# Patient Record
Sex: FEMALE | Race: WHITE | Employment: PART TIME | URBAN - METROPOLITAN AREA
[De-identification: names, ages, dates, MRNs, and addresses within clinical notes are randomized per-mention and may not be internally consistent; named-entity substitution may affect disease eponyms.]

---

## 2018-07-30 ENCOUNTER — OFFICE VISIT (OUTPATIENT)
Dept: OBGYN CLINIC | Age: 23
End: 2018-07-30
Payer: MEDICAID

## 2018-07-30 VITALS
WEIGHT: 227 LBS | SYSTOLIC BLOOD PRESSURE: 110 MMHG | BODY MASS INDEX: 33.62 KG/M2 | HEIGHT: 69 IN | DIASTOLIC BLOOD PRESSURE: 64 MMHG | HEART RATE: 84 BPM

## 2018-07-30 DIAGNOSIS — N91.2 AMENORRHEA: Primary | ICD-10-CM

## 2018-07-30 PROCEDURE — 1036F TOBACCO NON-USER: CPT | Performed by: OBSTETRICS & GYNECOLOGY

## 2018-07-30 PROCEDURE — G8417 CALC BMI ABV UP PARAM F/U: HCPCS | Performed by: OBSTETRICS & GYNECOLOGY

## 2018-07-30 PROCEDURE — 99213 OFFICE O/P EST LOW 20 MIN: CPT | Performed by: OBSTETRICS & GYNECOLOGY

## 2018-07-30 PROCEDURE — G8427 DOCREV CUR MEDS BY ELIG CLIN: HCPCS | Performed by: OBSTETRICS & GYNECOLOGY

## 2018-07-30 NOTE — PROGRESS NOTES
Expiration Date:   2019    Varicella Zoster Antibody, IgG     Standing Status:   Future     Number of Occurrences:   1     Standing Expiration Date:   2019    Prenatal Testing for Fetal Aneuploidy     PANORAMA TEST     Standing Status:   Future     Number of Occurrences:   1     Standing Expiration Date:   2019    Miscellaneous Lab Test #1     ARUN HORIZON     Standing Status:   Future     Number of Occurrences:   1     Standing Expiration Date:   2019     Order Specific Question:   Specify Req. Test (1 Test/Order)     Answer:   cystic fibrosis    Type and screen     Standing Status:   Future     Number of Occurrences:   1     Standing Expiration Date:   2019      No orders of the defined types were placed in this encounter. Plan:   MD Dalia Pierre  2018  Patient's last menstrual period was 2018. INITIAL OBSTETRICAL VISIT EVALUATION:  The patient was seen full history and physical was completed/reviewed. Cytology was collected for patients over 24years of age. Cultures were collected. The patient was counseled on office policies and she was counseled on termination of pregnancy in the state of [place]. The patient was counseled on Toxoplasmosis, HIV, Tobacco Abuse, Group Beta Strep Infections, Cystic Fibrosis,  Labor precautions and Sickle Cell disease. The patient was counseled on the risks of tobacco abuse. Both maternal and fetal. She was instructed to stop smoking if currently using tobacco. Morbidity, mortality, and cessation programs were reviewed. The risks include but are not limited to increased risks of  labor,  delivery, premature rupture of membranes, intrauterine growth restriction, intrauterine fetal demise and abruptio placenta. Secondary smoke risks were also reviewed. Increases in cancer, respiratory problems, and sudden infant death syndrome were reviewed as well.     The patient was informed of a 2-4% risk of congenital anomalies in the general population. She was also informed that karyotyping is the only way to evaluate the fetus for genetic problems and genetic lethal anomalies. Chorionic villous sampling, amniocentesis and Maternal Genetic Blood Sampling-(NIPT Testing) were also discussed with morbidity rates in detail. She requested any of the options. Route of delivery and counseling on vaginal, operative vaginal, and  sections were completed with the risks of each to both the patient as well as her baby. The possibility of a blood transfusion was discussed as well. The patient was not opposed to receiving a transfusion if needed. Nuchal translucency and MSAFP single marker testing was reviewed in detail with attention to timing of testing and their windows. For patients beyond the gestational age for Nuchal translucency evaluation Quad testing was recommended. Timing for the Quad test was reviewed. Benefits of the above testing was reviewed. A second trimester amniocentesis was also made available to the patient. Risks, Benefits and non-invasive alternative testing was reviewed. The literature regarding a questionable link to pitocin augmentation and induction of labor, the assistance of labor contractions and the initiation of contractions to help delivery, have been reviewed with the patient regarding the increased potential of having a  with Attention Deficit Hyperactivity Disorder and or Autism. These two disorders and the ramifications of their impact on a child and the family caring for that child has been reviewed with the patient in detail. She was given the risks, benefits and alternatives of the use of this medication. She has agreed to its use in the delivery of her unborn child if needed at the time of delivery, Yes.     The patient was questioned in detail regarding any genetic misnomer history, chromosomal abnormalities, or learning disabilities in  herself, the father of the

## 2018-08-01 DIAGNOSIS — N91.2 AMENORRHEA: ICD-10-CM

## 2018-08-01 LAB
ABO/RH: NORMAL
ANTIBODY SCREEN: NORMAL
BASOPHILS ABSOLUTE: 0 K/UL (ref 0–0.2)
BASOPHILS RELATIVE PERCENT: 0.3 %
EOSINOPHILS ABSOLUTE: 0.1 K/UL (ref 0–0.7)
EOSINOPHILS RELATIVE PERCENT: 0.9 %
GONADOTROPIN, CHORIONIC (HCG) QUANT: NORMAL MIU/ML
HCT VFR BLD CALC: 36.1 % (ref 37–47)
HEMOGLOBIN: 12.2 G/DL (ref 12–16)
LYMPHOCYTES ABSOLUTE: 1.4 K/UL (ref 1–4.8)
LYMPHOCYTES RELATIVE PERCENT: 14.2 %
MCH RBC QN AUTO: 30.2 PG (ref 27–31.3)
MCHC RBC AUTO-ENTMCNC: 33.7 % (ref 33–37)
MCV RBC AUTO: 89.7 FL (ref 82–100)
MONOCYTES ABSOLUTE: 0.5 K/UL (ref 0.2–0.8)
MONOCYTES RELATIVE PERCENT: 5.4 %
NEUTROPHILS ABSOLUTE: 7.7 K/UL (ref 1.4–6.5)
NEUTROPHILS RELATIVE PERCENT: 79.2 %
PDW BLD-RTO: 14.4 % (ref 11.5–14.5)
PLATELET # BLD: 271 K/UL (ref 130–400)
RBC # BLD: 4.02 M/UL (ref 4.2–5.4)
WBC # BLD: 9.8 K/UL (ref 4.8–10.8)

## 2018-08-02 ENCOUNTER — TELEPHONE (OUTPATIENT)
Dept: OBGYN CLINIC | Age: 23
End: 2018-08-02

## 2018-08-02 LAB
HEPATITIS B SURFACE ANTIGEN INTERPRETATION: NORMAL
RUBELLA ANTIBODY IGG: 124.3 IU/ML

## 2018-08-03 LAB
HEMOGLOBIN A-1 QUANTITATION: 96.9 % (ref 95–97.9)
HEMOGLOBIN A2 QUANTITATION: 2.9 % (ref 2–3.5)
HEMOGLOBIN C QUANTITATION: 0 % (ref 0–0)
HEMOGLOBIN E QUANTITATION: 0 % (ref 0–0)
HEMOGLOBIN ELECTROPHORESIS: NORMAL
HEMOGLOBIN EVALUATION: NORMAL
HEMOGLOBIN F QUANTITATION: 0.2 % (ref 0–2.1)
HEMOGLOBIN OTHER: 0 % (ref 0–0)
HEMOGLOBIN S QUANTITATION: 0 % (ref 0–0)
HIV-1 AND HIV-2 ANTIBODIES: NEGATIVE
PAP SMEAR: NORMAL
RPR: NORMAL
SICKLE CELL: NORMAL

## 2018-08-03 RX ORDER — AZITHROMYCIN 500 MG/1
1000 TABLET, FILM COATED ORAL ONCE
Qty: 2 TABLET | Refills: 0 | Status: SHIPPED | OUTPATIENT
Start: 2018-08-03 | End: 2018-08-03

## 2018-08-03 RX ORDER — METRONIDAZOLE 500 MG/1
500 TABLET ORAL 2 TIMES DAILY
Qty: 14 TABLET | Refills: 0 | Status: SHIPPED | OUTPATIENT
Start: 2018-08-03 | End: 2018-08-10

## 2018-08-06 LAB — VZV IGG SER QL IA: 1.55

## 2018-08-07 LAB
EER NON INVASIVE PRENATAL ANEUPLOIDY: NORMAL
FETAL FRACTION: 6.9
FETAL GENDER: NORMAL
GESTATIONAL AGE (DAYS): 5
GESTATIONAL AGE(WEEKS): 13
Lab: NORMAL
MATERNAL WEIGHT: 227
MONOSOMY X: NORMAL
REPORT FETUS GENDER: YES
TRIPLOIDY (VANISHING TWIN): NORMAL
TRISOMY 13 RISK: NORMAL
TRISOMY 18 RISK ASSESSMENT: NORMAL
TRISOMY 21 RISK: NORMAL

## 2018-08-14 LAB
CARRIER SCREEN, 4 CONDITIONS: NORMAL
EER CARRIER SCREEN, 4 CONDITIONS: NORMAL

## 2018-08-17 RX ORDER — AZITHROMYCIN 500 MG/1
1000 TABLET, FILM COATED ORAL ONCE
Qty: 2 TABLET | Refills: 0 | Status: SHIPPED | OUTPATIENT
Start: 2018-08-17 | End: 2018-08-17

## 2018-08-20 ENCOUNTER — INITIAL PRENATAL (OUTPATIENT)
Dept: OBGYN CLINIC | Age: 23
End: 2018-08-20
Payer: MEDICAID

## 2018-08-20 VITALS
SYSTOLIC BLOOD PRESSURE: 124 MMHG | WEIGHT: 226 LBS | BODY MASS INDEX: 33.37 KG/M2 | HEART RATE: 88 BPM | DIASTOLIC BLOOD PRESSURE: 82 MMHG

## 2018-08-20 DIAGNOSIS — Z86.19 HX OF CHLAMYDIA INFECTION: ICD-10-CM

## 2018-08-20 DIAGNOSIS — E66.9 OBESITY (BMI 30-39.9): ICD-10-CM

## 2018-08-20 DIAGNOSIS — Z34.02 ENCOUNTER FOR SUPERVISION OF NORMAL FIRST PREGNANCY IN SECOND TRIMESTER: Primary | ICD-10-CM

## 2018-08-20 DIAGNOSIS — N91.2 AMENORRHEA: ICD-10-CM

## 2018-08-20 LAB
AMPHETAMINE SCREEN, URINE: NORMAL
BACTERIA: ABNORMAL /HPF
BARBITURATE SCREEN URINE: NORMAL
BENZODIAZEPINE SCREEN, URINE: NORMAL
BILIRUBIN URINE: NEGATIVE
BILIRUBIN, POC: NORMAL
BLOOD URINE, POC: NORMAL
BLOOD, URINE: NEGATIVE
CANNABINOID SCREEN URINE: NORMAL
CLARITY, POC: CLEAR
CLARITY: ABNORMAL
COCAINE METABOLITE SCREEN URINE: NORMAL
COLOR, POC: YELLOW
COLOR: YELLOW
EPITHELIAL CELLS, UA: ABNORMAL /HPF
GLUCOSE URINE, POC: NORMAL
GLUCOSE URINE: NEGATIVE MG/DL
KETONES, POC: NORMAL
KETONES, URINE: NEGATIVE MG/DL
LEUKOCYTE EST, POC: NORMAL
LEUKOCYTE ESTERASE, URINE: ABNORMAL
Lab: NORMAL
NITRITE, POC: NORMAL
NITRITE, URINE: NEGATIVE
OPIATE SCREEN URINE: NORMAL
PH UA: 6.5 (ref 5–9)
PH, POC: 6.5
PHENCYCLIDINE SCREEN URINE: NORMAL
PROTEIN UA: NEGATIVE MG/DL
PROTEIN, POC: NORMAL
RBC UA: ABNORMAL /HPF (ref 0–2)
SPECIFIC GRAVITY UA: 1.01 (ref 1–1.03)
SPECIFIC GRAVITY, POC: 1.02
UROBILINOGEN, POC: NORMAL
UROBILINOGEN, URINE: 0.2 E.U./DL
WBC UA: ABNORMAL /HPF (ref 0–5)

## 2018-08-20 PROCEDURE — 1036F TOBACCO NON-USER: CPT | Performed by: OBSTETRICS & GYNECOLOGY

## 2018-08-20 PROCEDURE — 99213 OFFICE O/P EST LOW 20 MIN: CPT | Performed by: OBSTETRICS & GYNECOLOGY

## 2018-08-20 PROCEDURE — G8417 CALC BMI ABV UP PARAM F/U: HCPCS | Performed by: OBSTETRICS & GYNECOLOGY

## 2018-08-20 PROCEDURE — G8427 DOCREV CUR MEDS BY ELIG CLIN: HCPCS | Performed by: OBSTETRICS & GYNECOLOGY

## 2018-08-20 PROCEDURE — 81003 URINALYSIS AUTO W/O SCOPE: CPT | Performed by: OBSTETRICS & GYNECOLOGY

## 2018-08-20 NOTE — PROGRESS NOTES
OB visit      Lauren Mondragon is a 25y.o. year old female  @ L 2018 , 16.3 wks , OSWALDO 2019 CONFIRMED BY AFIRST TRIMESTER us . Complaints : nausea without vomiting for several days . POB: denies     PGYN: denies     PMH: obesity     PSH: denies            degenrative bone disease , has screws in hips. Was told she should be able to position for delivery . MEDS: denies     Drug Allergies: NKDA    SOCHX: denies x 3    FH: Mother or Father , DM No , HTN father , Other No    /82   Pulse 88   Wt 226 lb (102.5 kg)   LMP 2018   BMI 33.37 kg/m²   No past medical history on file. No past surgical history on file. Review of Systems  Constitutional: negative  Genitourinary:see above  Integument/breast: negative  Behavioral/Psych: negative  Endocrine: negative     All other systems reviewed and are negative. Physical Exam:  /82   Pulse 88   Wt 226 lb (102.5 kg)   LMP 2018   BMI 33.37 kg/m²   Skin: Warm, dry, no lesions or rashes  Extremities: Without clubbing, cyanosis and edema. Palms and nails are normal. Ambulates without difficulty  Neurological: No gross sensory or motor deficits. Abdomen: Soft, non-tender without masses or organomegaly  bowel sounds normoactive    HOF : subumbilical     FHT : 983 bpm .       Assessment:   1. Encounter for supervision of normal first pregnancy in second trimester    2. Obesity (BMI 30-39.9)    3. Hx of chlamydia infection        Plan:     MIRANDA withdrawn today     Orders Placed This Encounter   Procedures    C. Trachomatis / N.  Gonorrhoeae, DNA     Standing Status:   Future     Standing Expiration Date:   2019    US OB Detail Fetal Anatomy Single or 1st     Standing Status:   Future     Standing Expiration Date:   2019    Glucose Tolerance, 1 Hour     Standing Status:   Future     Standing Expiration Date:   2019    POCT Urinalysis No Micro (Auto)      No orders of the defined types were placed in this encounter. Plan:   Rafael Craven MD    Initial labs drawn. Prenatal vitamins. Problem list reviewed and updated. Counseled about QUAD/ NIPT  Role of ultrasound in pregnancy discussed  Follow-up in 4 weeks  Early 1 hr OGTT - indicated ordered  Hep C screen indicated : no  FLU- was not indicated  TDAP- indicated ,     Murtaza Cortes M.D., MONAE G

## 2018-08-21 DIAGNOSIS — Z86.19 HX OF CHLAMYDIA INFECTION: ICD-10-CM

## 2018-08-21 DIAGNOSIS — Z34.02 ENCOUNTER FOR SUPERVISION OF NORMAL FIRST PREGNANCY IN SECOND TRIMESTER: ICD-10-CM

## 2018-08-21 DIAGNOSIS — E66.9 OBESITY (BMI 30-39.9): ICD-10-CM

## 2018-08-21 LAB — GLUCOSE, 1HR PP: 117 MG/DL (ref 60–140)

## 2018-08-22 LAB — URINE CULTURE, ROUTINE: NORMAL

## 2018-09-03 PROBLEM — N91.2 AMENORRHEA: Status: ACTIVE | Noted: 2018-09-03

## 2018-09-04 ENCOUNTER — HOSPITAL ENCOUNTER (OUTPATIENT)
Dept: ULTRASOUND IMAGING | Age: 23
Discharge: HOME | End: 2018-09-06
Payer: MEDICAID

## 2018-09-04 DIAGNOSIS — Z34.02 ENCOUNTER FOR SUPERVISION OF NORMAL FIRST PREGNANCY IN SECOND TRIMESTER: ICD-10-CM

## 2018-09-04 PROCEDURE — 76805 OB US >/= 14 WKS SNGL FETUS: CPT

## 2018-09-17 ENCOUNTER — ROUTINE PRENATAL (OUTPATIENT)
Dept: OBGYN CLINIC | Age: 23
End: 2018-09-17
Payer: MEDICAID

## 2018-09-17 VITALS
SYSTOLIC BLOOD PRESSURE: 118 MMHG | WEIGHT: 227 LBS | BODY MASS INDEX: 33.52 KG/M2 | DIASTOLIC BLOOD PRESSURE: 74 MMHG | HEART RATE: 92 BPM

## 2018-09-17 DIAGNOSIS — Z3A.20 20 WEEKS GESTATION OF PREGNANCY: ICD-10-CM

## 2018-09-17 DIAGNOSIS — Z34.02 ENCOUNTER FOR SUPERVISION OF NORMAL FIRST PREGNANCY IN SECOND TRIMESTER: Primary | ICD-10-CM

## 2018-09-17 LAB
BILIRUBIN, POC: NORMAL
BLOOD URINE, POC: NORMAL
CLARITY, POC: CLEAR
COLOR, POC: YELLOW
GLUCOSE URINE, POC: NORMAL
KETONES, POC: NORMAL
LEUKOCYTE EST, POC: NORMAL
NITRITE, POC: NORMAL
PH, POC: 6
PROTEIN, POC: NORMAL
SPECIFIC GRAVITY, POC: 1.02
UROBILINOGEN, POC: NORMAL

## 2018-09-17 PROCEDURE — 1036F TOBACCO NON-USER: CPT | Performed by: OBSTETRICS & GYNECOLOGY

## 2018-09-17 PROCEDURE — G8417 CALC BMI ABV UP PARAM F/U: HCPCS | Performed by: OBSTETRICS & GYNECOLOGY

## 2018-09-17 PROCEDURE — 99213 OFFICE O/P EST LOW 20 MIN: CPT | Performed by: OBSTETRICS & GYNECOLOGY

## 2018-09-17 PROCEDURE — G8427 DOCREV CUR MEDS BY ELIG CLIN: HCPCS | Performed by: OBSTETRICS & GYNECOLOGY

## 2018-09-17 NOTE — PROGRESS NOTES
OB visit      Jaja Christopher is a 21y.o. year old female  @ L 2018 , 20.3 wks , OSWALDO 2019 CONFIRMED BY [facility]IME[facility] us . Complaints : none. POB: denies     PGYN: denies     PMH: obesity     PSH: denies            degenrative bone disease , has screws in hips. Was told she should be able to position for delivery . MEDS: denies     Drug Allergies: NKDA    SOCHX: denies x 3    FH: Mother or Father , DM No , HTN father , Other No    /74   Pulse 92   Wt 227 lb (103 kg)   LMP 2018   BMI 33.52 kg/m²   No past medical history on file. No past surgical history on file. Review of Systems  Constitutional: negative  Genitourinary:see above  Integument/breast: negative  Behavioral/Psych: negative  Endocrine: negative     All other systems reviewed and are negative. Physical Exam:  /74   Pulse 92   Wt 227 lb (103 kg)   LMP 2018   BMI 33.52 kg/m²   Skin: Warm, dry, no lesions or rashes  Extremities: Without clubbing, cyanosis and edema. Palms and nails are normal. Ambulates without difficulty  Neurological: No gross sensory or motor deficits. Abdomen: Soft, non-tender without masses or organomegaly  bowel sounds normoactive    HOF : umbilical     FHT : 516 bpm .       Assessment:   1. Encounter for supervision of normal first pregnancy in second trimester    2. 20 weeks gestation of pregnancy        Plan:     MIRANDA withdrawn today     Orders Placed This Encounter   Procedures    US OB Detail Fetal Anatomy Single or 1st     Standing Status:   Future     Standing Expiration Date:   2019    POCT Urinalysis No Micro (Auto)      No orders of the defined types were placed in this encounter. Plan:   Adele Katz MD    Initial labs drawn. Prenatal vitamins. Problem list reviewed and updated.   Counseled about QUAD/ NIPT  Role of ultrasound in pregnancy discussed  Follow-up in 4 weeks  Early 1 hr OGTT - indicated ordered  Hep C screen indicated : no  FLU- was

## 2018-09-18 ENCOUNTER — TELEPHONE (OUTPATIENT)
Dept: OBGYN CLINIC | Age: 23
End: 2018-09-18

## 2018-09-18 NOTE — TELEPHONE ENCOUNTER
[facility] scheduling sent a message about US that was ordered at [address] appointment. She had anatomy scan done 9/4/18. Did you want this repeated?

## 2018-09-20 ENCOUNTER — TELEPHONE (OUTPATIENT)
Dept: OBGYN CLINIC | Age: 23
End: 2018-09-20

## 2018-10-19 ENCOUNTER — ROUTINE PRENATAL (OUTPATIENT)
Dept: OBGYN CLINIC | Age: 23
End: 2018-10-19
Payer: MEDICAID

## 2018-10-19 VITALS
BODY MASS INDEX: 34.11 KG/M2 | DIASTOLIC BLOOD PRESSURE: 74 MMHG | HEART RATE: 96 BPM | SYSTOLIC BLOOD PRESSURE: 116 MMHG | WEIGHT: 231 LBS

## 2018-10-19 DIAGNOSIS — Z34.02 ENCOUNTER FOR SUPERVISION OF NORMAL FIRST PREGNANCY IN SECOND TRIMESTER: ICD-10-CM

## 2018-10-19 DIAGNOSIS — Z34.02 ENCOUNTER FOR SUPERVISION OF NORMAL FIRST PREGNANCY IN SECOND TRIMESTER: Primary | ICD-10-CM

## 2018-10-19 DIAGNOSIS — R12 HEART BURN: ICD-10-CM

## 2018-10-19 DIAGNOSIS — Z3A.25 25 WEEKS GESTATION OF PREGNANCY: ICD-10-CM

## 2018-10-19 DIAGNOSIS — Z23 NEED FOR PROPHYLACTIC VACCINATION AND INOCULATION AGAINST INFLUENZA: ICD-10-CM

## 2018-10-19 LAB
BASOPHILS ABSOLUTE: 0 K/UL (ref 0–0.2)
BASOPHILS RELATIVE PERCENT: 0.2 %
BILIRUBIN, POC: ABNORMAL
BLOOD URINE, POC: ABNORMAL
CLARITY, POC: CLEAR
COLOR, POC: YELLOW
EOSINOPHILS ABSOLUTE: 0.1 K/UL (ref 0–0.7)
EOSINOPHILS RELATIVE PERCENT: 0.8 %
GLUCOSE URINE, POC: ABNORMAL
GLUCOSE, 1HR PP: 106 MG/DL (ref 60–140)
HCT VFR BLD CALC: 35.3 % (ref 37–47)
HEMOGLOBIN: 11.8 G/DL (ref 12–16)
KETONES, POC: ABNORMAL
LEUKOCYTE EST, POC: ABNORMAL
LYMPHOCYTES ABSOLUTE: 1.4 K/UL (ref 1–4.8)
LYMPHOCYTES RELATIVE PERCENT: 11.8 %
MCH RBC QN AUTO: 31 PG (ref 27–31.3)
MCHC RBC AUTO-ENTMCNC: 33.5 % (ref 33–37)
MCV RBC AUTO: 92.5 FL (ref 82–100)
MONOCYTES ABSOLUTE: 0.9 K/UL (ref 0.2–0.8)
MONOCYTES RELATIVE PERCENT: 7.6 %
NEUTROPHILS ABSOLUTE: 9.3 K/UL (ref 1.4–6.5)
NEUTROPHILS RELATIVE PERCENT: 79.6 %
NITRITE, POC: ABNORMAL
PDW BLD-RTO: 13.7 % (ref 11.5–14.5)
PH, POC: 6
PLATELET # BLD: 282 K/UL (ref 130–400)
PROTEIN, POC: ABNORMAL
RBC # BLD: 3.81 M/UL (ref 4.2–5.4)
SPECIFIC GRAVITY, POC: 1.03
UROBILINOGEN, POC: ABNORMAL
WBC # BLD: 11.7 K/UL (ref 4.8–10.8)

## 2018-10-19 PROCEDURE — 90686 IIV4 VACC NO PRSV 0.5 ML IM: CPT | Performed by: OBSTETRICS & GYNECOLOGY

## 2018-10-19 PROCEDURE — 81003 URINALYSIS AUTO W/O SCOPE: CPT | Performed by: OBSTETRICS & GYNECOLOGY

## 2018-10-19 PROCEDURE — 1036F TOBACCO NON-USER: CPT | Performed by: OBSTETRICS & GYNECOLOGY

## 2018-10-19 PROCEDURE — G8427 DOCREV CUR MEDS BY ELIG CLIN: HCPCS | Performed by: OBSTETRICS & GYNECOLOGY

## 2018-10-19 PROCEDURE — 99213 OFFICE O/P EST LOW 20 MIN: CPT | Performed by: OBSTETRICS & GYNECOLOGY

## 2018-10-19 PROCEDURE — G8417 CALC BMI ABV UP PARAM F/U: HCPCS | Performed by: OBSTETRICS & GYNECOLOGY

## 2018-10-19 PROCEDURE — G8484 FLU IMMUNIZE NO ADMIN: HCPCS | Performed by: OBSTETRICS & GYNECOLOGY

## 2018-10-19 PROCEDURE — 90471 IMMUNIZATION ADMIN: CPT | Performed by: OBSTETRICS & GYNECOLOGY

## 2018-10-19 RX ORDER — RANITIDINE 150 MG/1
150 TABLET ORAL 2 TIMES DAILY
Qty: 60 TABLET | Refills: 3 | Status: SHIPPED | OUTPATIENT
Start: 2018-10-19 | End: 2019-02-13

## 2018-10-19 RX ORDER — PROMETHAZINE HYDROCHLORIDE 25 MG/1
25 SUPPOSITORY RECTAL EVERY 6 HOURS PRN
Qty: 12 SUPPOSITORY | Refills: 2 | Status: SHIPPED | OUTPATIENT
Start: 2018-10-19 | End: 2018-10-26

## 2018-10-19 NOTE — PROGRESS NOTES
 POCT Urinalysis No Micro (Auto)        Orders Placed This Encounter   Medications    ranitidine (ZANTAC) 150 MG tablet     Sig: Take 1 tablet by mouth 2 times daily     Dispense:  60 tablet     Refill:  3    promethazine (PROMETHEGAN) 25 MG suppository     Sig: Place 1 suppository rectally every 6 hours as needed for Nausea     Dispense:  12 suppository     Refill:  2     Plan:   Malka Cuellar MD    Initial labs drawn. Prenatal vitamins. Problem list reviewed and updated. Counseled about QUAD/ NIPT  Role of ultrasound in pregnancy discussed  Follow-up in 4 weeks  Early 1 hr OGTT - indicated ordered  Hep C screen indicated : no  FLU- was not indicated  TDAP- indicated ,     Jen Bennett M.D., F.A.C.O. G

## 2018-10-21 LAB — URINE CULTURE, ROUTINE: NORMAL

## 2018-11-16 ENCOUNTER — ROUTINE PRENATAL (OUTPATIENT)
Dept: OBGYN CLINIC | Age: 23
End: 2018-11-16
Payer: MEDICAID

## 2018-11-16 VITALS
DIASTOLIC BLOOD PRESSURE: 70 MMHG | SYSTOLIC BLOOD PRESSURE: 108 MMHG | BODY MASS INDEX: 35.15 KG/M2 | HEART RATE: 108 BPM | WEIGHT: 238 LBS

## 2018-11-16 DIAGNOSIS — Z34.03 ENCOUNTER FOR SUPERVISION OF NORMAL FIRST PREGNANCY IN THIRD TRIMESTER: Primary | ICD-10-CM

## 2018-11-16 DIAGNOSIS — Z3A.28 28 WEEKS GESTATION OF PREGNANCY: ICD-10-CM

## 2018-11-16 LAB
BACTERIA: ABNORMAL /HPF
BILIRUBIN URINE: NEGATIVE
BILIRUBIN, POC: ABNORMAL
BLOOD URINE, POC: ABNORMAL
BLOOD, URINE: NEGATIVE
CLARITY, POC: ABNORMAL
CLARITY: ABNORMAL
COLOR, POC: YELLOW
COLOR: YELLOW
EPITHELIAL CELLS, UA: >100 /HPF
GLUCOSE URINE, POC: ABNORMAL
GLUCOSE URINE: NEGATIVE MG/DL
KETONES, POC: ABNORMAL
KETONES, URINE: NEGATIVE MG/DL
LEUKOCYTE EST, POC: ABNORMAL
LEUKOCYTE ESTERASE, URINE: ABNORMAL
NITRITE, POC: ABNORMAL
NITRITE, URINE: NEGATIVE
PH UA: 7 (ref 5–9)
PH, POC: 7
PROTEIN UA: NEGATIVE MG/DL
PROTEIN, POC: ABNORMAL
RBC UA: ABNORMAL /HPF (ref 0–2)
SPECIFIC GRAVITY UA: 1.01 (ref 1–1.03)
SPECIFIC GRAVITY, POC: 1.01
UROBILINOGEN, POC: ABNORMAL
UROBILINOGEN, URINE: 0.2 E.U./DL
WBC UA: ABNORMAL /HPF (ref 0–5)

## 2018-11-16 PROCEDURE — G8417 CALC BMI ABV UP PARAM F/U: HCPCS | Performed by: OBSTETRICS & GYNECOLOGY

## 2018-11-16 PROCEDURE — G8427 DOCREV CUR MEDS BY ELIG CLIN: HCPCS | Performed by: OBSTETRICS & GYNECOLOGY

## 2018-11-16 PROCEDURE — G8482 FLU IMMUNIZE ORDER/ADMIN: HCPCS | Performed by: OBSTETRICS & GYNECOLOGY

## 2018-11-16 PROCEDURE — 1036F TOBACCO NON-USER: CPT | Performed by: OBSTETRICS & GYNECOLOGY

## 2018-11-16 PROCEDURE — 99213 OFFICE O/P EST LOW 20 MIN: CPT | Performed by: OBSTETRICS & GYNECOLOGY

## 2018-11-16 NOTE — PROGRESS NOTES
QUAD/ NIPT  Role of ultrasound in pregnancy discussed  Follow-up in 4 weeks  Early 1 hr OGTT - indicated ordered  Hep C screen indicated : no  FLU- received  TDAP- indicated     Jen Bennett M.D., FVALERIE.C.O. G

## 2018-11-18 LAB — URINE CULTURE, ROUTINE: NORMAL

## 2018-12-07 ENCOUNTER — ROUTINE PRENATAL (OUTPATIENT)
Dept: OBGYN CLINIC | Age: 23
End: 2018-12-07
Payer: MEDICAID

## 2018-12-07 VITALS
WEIGHT: 242 LBS | HEART RATE: 72 BPM | DIASTOLIC BLOOD PRESSURE: 78 MMHG | SYSTOLIC BLOOD PRESSURE: 114 MMHG | HEIGHT: 69 IN | BODY MASS INDEX: 35.84 KG/M2

## 2018-12-07 DIAGNOSIS — Z34.03 ENCOUNTER FOR SUPERVISION OF NORMAL FIRST PREGNANCY IN THIRD TRIMESTER: ICD-10-CM

## 2018-12-07 DIAGNOSIS — Z3A.32 32 WEEKS GESTATION OF PREGNANCY: ICD-10-CM

## 2018-12-07 DIAGNOSIS — R12 HEART BURN: ICD-10-CM

## 2018-12-07 DIAGNOSIS — Z34.03 ENCOUNTER FOR SUPERVISION OF NORMAL FIRST PREGNANCY IN THIRD TRIMESTER: Primary | ICD-10-CM

## 2018-12-07 DIAGNOSIS — E66.9 OBESITY (BMI 30-39.9): ICD-10-CM

## 2018-12-07 LAB
BACTERIA: ABNORMAL /HPF
BILIRUBIN URINE: NEGATIVE
BILIRUBIN, POC: ABNORMAL
BLOOD URINE, POC: ABNORMAL
BLOOD, URINE: NEGATIVE
CLARITY, POC: CLEAR
CLARITY: ABNORMAL
COLOR, POC: YELLOW
COLOR: YELLOW
CRYSTALS, UA: ABNORMAL
EPITHELIAL CELLS, UA: ABNORMAL /HPF (ref 0–5)
GLUCOSE URINE, POC: ABNORMAL
GLUCOSE URINE: NEGATIVE MG/DL
KETONES, POC: ABNORMAL
KETONES, URINE: NEGATIVE MG/DL
LEUKOCYTE EST, POC: ABNORMAL
LEUKOCYTE ESTERASE, URINE: ABNORMAL
NITRITE, POC: ABNORMAL
NITRITE, URINE: NEGATIVE
PH UA: 6 (ref 5–9)
PH, POC: 6
PROTEIN UA: NEGATIVE MG/DL
PROTEIN, POC: ABNORMAL
RBC UA: ABNORMAL /HPF (ref 0–2)
SPECIFIC GRAVITY UA: 1.02 (ref 1–1.03)
SPECIFIC GRAVITY, POC: 1.02
UROBILINOGEN, POC: ABNORMAL
UROBILINOGEN, URINE: 0.2 E.U./DL
WBC UA: ABNORMAL /HPF (ref 0–5)

## 2018-12-07 PROCEDURE — G8417 CALC BMI ABV UP PARAM F/U: HCPCS | Performed by: OBSTETRICS & GYNECOLOGY

## 2018-12-07 PROCEDURE — 99213 OFFICE O/P EST LOW 20 MIN: CPT | Performed by: OBSTETRICS & GYNECOLOGY

## 2018-12-07 PROCEDURE — 1036F TOBACCO NON-USER: CPT | Performed by: OBSTETRICS & GYNECOLOGY

## 2018-12-07 PROCEDURE — G8427 DOCREV CUR MEDS BY ELIG CLIN: HCPCS | Performed by: OBSTETRICS & GYNECOLOGY

## 2018-12-07 PROCEDURE — G8482 FLU IMMUNIZE ORDER/ADMIN: HCPCS | Performed by: OBSTETRICS & GYNECOLOGY

## 2018-12-09 LAB — URINE CULTURE, ROUTINE: NORMAL

## 2018-12-10 RX ORDER — NITROFURANTOIN 25; 75 MG/1; MG/1
100 CAPSULE ORAL 2 TIMES DAILY
Qty: 20 CAPSULE | Refills: 0 | Status: SHIPPED | OUTPATIENT
Start: 2018-12-10 | End: 2018-12-20

## 2018-12-20 ENCOUNTER — ROUTINE PRENATAL (OUTPATIENT)
Dept: OBGYN CLINIC | Age: 23
End: 2018-12-20
Payer: MEDICAID

## 2018-12-20 VITALS
DIASTOLIC BLOOD PRESSURE: 80 MMHG | SYSTOLIC BLOOD PRESSURE: 124 MMHG | WEIGHT: 242 LBS | HEART RATE: 110 BPM | BODY MASS INDEX: 35.74 KG/M2

## 2018-12-20 DIAGNOSIS — Z34.03 ENCOUNTER FOR SUPERVISION OF NORMAL FIRST PREGNANCY IN THIRD TRIMESTER: ICD-10-CM

## 2018-12-20 DIAGNOSIS — Z3A.33 33 WEEKS GESTATION OF PREGNANCY: Primary | ICD-10-CM

## 2018-12-20 PROCEDURE — G8417 CALC BMI ABV UP PARAM F/U: HCPCS | Performed by: OBSTETRICS & GYNECOLOGY

## 2018-12-20 PROCEDURE — G8427 DOCREV CUR MEDS BY ELIG CLIN: HCPCS | Performed by: OBSTETRICS & GYNECOLOGY

## 2018-12-20 PROCEDURE — 99213 OFFICE O/P EST LOW 20 MIN: CPT | Performed by: OBSTETRICS & GYNECOLOGY

## 2018-12-20 PROCEDURE — G8482 FLU IMMUNIZE ORDER/ADMIN: HCPCS | Performed by: OBSTETRICS & GYNECOLOGY

## 2018-12-20 PROCEDURE — 1036F TOBACCO NON-USER: CPT | Performed by: OBSTETRICS & GYNECOLOGY

## 2019-01-03 ENCOUNTER — HOSPITAL ENCOUNTER (INPATIENT)
Age: 24
LOS: 6 days | Discharge: HOME | DRG: 560 | End: 2019-01-09
Attending: OBSTETRICS & GYNECOLOGY | Admitting: OBSTETRICS & GYNECOLOGY
Payer: MEDICAID

## 2019-01-03 ENCOUNTER — APPOINTMENT (OUTPATIENT)
Dept: ULTRASOUND IMAGING | Age: 24
DRG: 560 | End: 2019-01-03
Payer: MEDICAID

## 2019-01-03 PROBLEM — O13.9 PREGNANCY INDUCED HYPERTENSION, ANTEPARTUM: Status: ACTIVE | Noted: 2019-01-03

## 2019-01-03 LAB
ALBUMIN SERPL-MCNC: 3.5 G/DL (ref 3.9–4.9)
ALP BLD-CCNC: 130 U/L (ref 40–130)
ALT SERPL-CCNC: 14 U/L (ref 0–33)
AMPHETAMINE SCREEN, URINE: NORMAL
AMYLASE: 35 U/L (ref 28–100)
ANION GAP SERPL CALCULATED.3IONS-SCNC: 14 MEQ/L (ref 7–13)
AST SERPL-CCNC: 40 U/L (ref 0–35)
BACTERIA: ABNORMAL /HPF
BARBITURATE SCREEN URINE: NORMAL
BASOPHILS ABSOLUTE: 0 K/UL (ref 0–0.2)
BASOPHILS RELATIVE PERCENT: 0.3 %
BENZODIAZEPINE SCREEN, URINE: NORMAL
BILIRUB SERPL-MCNC: 0.3 MG/DL (ref 0–1.2)
BILIRUBIN URINE: NEGATIVE
BLOOD, URINE: NEGATIVE
BUN BLDV-MCNC: 5 MG/DL (ref 6–20)
CALCIUM SERPL-MCNC: 8.7 MG/DL (ref 8.6–10.2)
CANNABINOID SCREEN URINE: NORMAL
CHLORIDE BLD-SCNC: 102 MEQ/L (ref 98–107)
CLARITY: ABNORMAL
CO2: 20 MEQ/L (ref 22–29)
COCAINE METABOLITE SCREEN URINE: NORMAL
COLOR: ABNORMAL
CREAT SERPL-MCNC: 0.34 MG/DL (ref 0.5–0.9)
EOSINOPHILS ABSOLUTE: 0 K/UL (ref 0–0.7)
EOSINOPHILS RELATIVE PERCENT: 0.2 %
EPITHELIAL CELLS, UA: ABNORMAL /HPF (ref 0–5)
GFR AFRICAN AMERICAN: >60
GFR NON-AFRICAN AMERICAN: >60
GLOBULIN: 3.4 G/DL (ref 2.3–3.5)
GLUCOSE BLD-MCNC: 89 MG/DL (ref 74–109)
GLUCOSE URINE: NEGATIVE MG/DL
HCT VFR BLD CALC: 34.8 % (ref 37–47)
HEMOGLOBIN: 12.1 G/DL (ref 12–16)
HYALINE CASTS: ABNORMAL /HPF (ref 0–5)
KETONES, URINE: >=80 MG/DL
LEUKOCYTE ESTERASE, URINE: ABNORMAL
LIPASE: 19 U/L (ref 13–60)
LYMPHOCYTES ABSOLUTE: 0.6 K/UL (ref 1–4.8)
LYMPHOCYTES RELATIVE PERCENT: 4.1 %
Lab: NORMAL
MCH RBC QN AUTO: 31.1 PG (ref 27–31.3)
MCHC RBC AUTO-ENTMCNC: 34.7 % (ref 33–37)
MCV RBC AUTO: 89.7 FL (ref 82–100)
MONOCYTES ABSOLUTE: 0.7 K/UL (ref 0.2–0.8)
MONOCYTES RELATIVE PERCENT: 4.6 %
NEUTROPHILS ABSOLUTE: 13.3 K/UL (ref 1.4–6.5)
NEUTROPHILS RELATIVE PERCENT: 90.8 %
NITRITE, URINE: NEGATIVE
OPIATE SCREEN URINE: NORMAL
PDW BLD-RTO: 12.6 % (ref 11.5–14.5)
PH UA: 6.5 (ref 5–9)
PHENCYCLIDINE SCREEN URINE: NORMAL
PLATELET # BLD: 268 K/UL (ref 130–400)
POTASSIUM SERPL-SCNC: 5 MEQ/L (ref 3.5–5.1)
PROTEIN UA: NEGATIVE MG/DL
RBC # BLD: 3.88 M/UL (ref 4.2–5.4)
RBC UA: ABNORMAL /HPF (ref 0–2)
SODIUM BLD-SCNC: 136 MEQ/L (ref 132–144)
SPECIFIC GRAVITY UA: 1.02 (ref 1–1.03)
TOTAL PROTEIN: 6.9 G/DL (ref 6.4–8.1)
URIC ACID, SERUM: 5.4 MG/DL (ref 2.4–5.7)
UROBILINOGEN, URINE: 0.2 E.U./DL
WBC # BLD: 14.7 K/UL (ref 4.8–10.8)
WBC UA: ABNORMAL /HPF (ref 0–5)

## 2019-01-03 PROCEDURE — 76815 OB US LIMITED FETUS(S): CPT

## 2019-01-03 PROCEDURE — 36415 COLL VENOUS BLD VENIPUNCTURE: CPT

## 2019-01-03 PROCEDURE — 99232 SBSQ HOSP IP/OBS MODERATE 35: CPT | Performed by: OBSTETRICS & GYNECOLOGY

## 2019-01-03 PROCEDURE — 82150 ASSAY OF AMYLASE: CPT

## 2019-01-03 PROCEDURE — 84550 ASSAY OF BLOOD/URIC ACID: CPT

## 2019-01-03 PROCEDURE — 76817 TRANSVAGINAL US OBSTETRIC: CPT

## 2019-01-03 PROCEDURE — 1220000000 HC SEMI PRIVATE OB R&B

## 2019-01-03 PROCEDURE — 80053 COMPREHEN METABOLIC PANEL: CPT

## 2019-01-03 PROCEDURE — 84156 ASSAY OF PROTEIN URINE: CPT

## 2019-01-03 PROCEDURE — 85025 COMPLETE CBC W/AUTO DIFF WBC: CPT

## 2019-01-03 PROCEDURE — 6360000002 HC RX W HCPCS: Performed by: OBSTETRICS & GYNECOLOGY

## 2019-01-03 PROCEDURE — 83690 ASSAY OF LIPASE: CPT

## 2019-01-03 PROCEDURE — 80307 DRUG TEST PRSMV CHEM ANLYZR: CPT

## 2019-01-03 PROCEDURE — 87086 URINE CULTURE/COLONY COUNT: CPT

## 2019-01-03 PROCEDURE — 6370000000 HC RX 637 (ALT 250 FOR IP): Performed by: OBSTETRICS & GYNECOLOGY

## 2019-01-03 PROCEDURE — 2580000003 HC RX 258: Performed by: OBSTETRICS & GYNECOLOGY

## 2019-01-03 PROCEDURE — 81001 URINALYSIS AUTO W/SCOPE: CPT

## 2019-01-03 RX ORDER — MAGNESIUM SULFATE IN WATER 40 MG/ML
4 INJECTION, SOLUTION INTRAVENOUS ONCE
Status: COMPLETED | OUTPATIENT
Start: 2019-01-03 | End: 2019-01-04

## 2019-01-03 RX ORDER — ONDANSETRON 2 MG/ML
4 INJECTION INTRAMUSCULAR; INTRAVENOUS EVERY 6 HOURS PRN
Status: DISCONTINUED | OUTPATIENT
Start: 2019-01-03 | End: 2019-01-04 | Stop reason: SDUPTHER

## 2019-01-03 RX ORDER — ACETAMINOPHEN 325 MG/1
650 TABLET ORAL EVERY 4 HOURS PRN
Status: DISCONTINUED | OUTPATIENT
Start: 2019-01-03 | End: 2019-01-05

## 2019-01-03 RX ORDER — LABETALOL 200 MG/1
200 TABLET, FILM COATED ORAL EVERY 8 HOURS SCHEDULED
Status: DISCONTINUED | OUTPATIENT
Start: 2019-01-03 | End: 2019-01-05

## 2019-01-03 RX ORDER — ONDANSETRON 4 MG/1
4 TABLET, ORALLY DISINTEGRATING ORAL ONCE
Status: COMPLETED | OUTPATIENT
Start: 2019-01-03 | End: 2019-01-03

## 2019-01-03 RX ORDER — SODIUM CHLORIDE, SODIUM LACTATE, POTASSIUM CHLORIDE, CALCIUM CHLORIDE 600; 310; 30; 20 MG/100ML; MG/100ML; MG/100ML; MG/100ML
INJECTION, SOLUTION INTRAVENOUS CONTINUOUS
Status: DISCONTINUED | OUTPATIENT
Start: 2019-01-03 | End: 2019-01-04 | Stop reason: SDUPTHER

## 2019-01-03 RX ADMIN — ONDANSETRON 4 MG: 4 TABLET, ORALLY DISINTEGRATING ORAL at 15:30

## 2019-01-03 RX ADMIN — LABETALOL HYDROCHLORIDE 200 MG: 200 TABLET, FILM COATED ORAL at 22:33

## 2019-01-03 RX ADMIN — ACETAMINOPHEN 650 MG: 325 TABLET ORAL at 16:37

## 2019-01-03 RX ADMIN — MAGNESIUM SULFATE HEPTAHYDRATE 1 G/HR: 40 INJECTION, SOLUTION INTRAVENOUS at 21:39

## 2019-01-03 RX ADMIN — ONDANSETRON 4 MG: 2 INJECTION INTRAMUSCULAR; INTRAVENOUS at 21:34

## 2019-01-03 RX ADMIN — SODIUM CHLORIDE, POTASSIUM CHLORIDE, SODIUM LACTATE AND CALCIUM CHLORIDE: 600; 310; 30; 20 INJECTION, SOLUTION INTRAVENOUS at 19:22

## 2019-01-03 RX ADMIN — MAGNESIUM SULFATE HEPTAHYDRATE 4 G: 40 INJECTION, SOLUTION INTRAVENOUS at 21:09

## 2019-01-03 RX ADMIN — CEFAZOLIN 1 G: 1 INJECTION, POWDER, FOR SOLUTION INTRAMUSCULAR; INTRAVENOUS at 21:23

## 2019-01-03 RX ADMIN — SODIUM CHLORIDE, POTASSIUM CHLORIDE, SODIUM LACTATE AND CALCIUM CHLORIDE: 600; 310; 30; 20 INJECTION, SOLUTION INTRAVENOUS at 15:32

## 2019-01-03 ASSESSMENT — PAIN SCALES - GENERAL: PAINLEVEL_OUTOF10: 4

## 2019-01-04 PROBLEM — O13.3 GESTATIONAL HYPERTENSION, THIRD TRIMESTER: Status: ACTIVE | Noted: 2019-01-04

## 2019-01-04 LAB
24HR URINE VOLUME (ML): 2350 ML
ALBUMIN SERPL-MCNC: 3.1 G/DL (ref 3.9–4.9)
ALBUMIN SERPL-MCNC: 3.4 G/DL (ref 3.9–4.9)
ALP BLD-CCNC: 115 U/L (ref 40–130)
ALP BLD-CCNC: 124 U/L (ref 40–130)
ALT SERPL-CCNC: 10 U/L (ref 0–33)
ALT SERPL-CCNC: 9 U/L (ref 0–33)
ANION GAP SERPL CALCULATED.3IONS-SCNC: 16 MEQ/L (ref 7–13)
ANION GAP SERPL CALCULATED.3IONS-SCNC: 19 MEQ/L (ref 7–13)
AST SERPL-CCNC: 14 U/L (ref 0–35)
AST SERPL-CCNC: 15 U/L (ref 0–35)
BASOPHILS ABSOLUTE: 0 K/UL (ref 0–0.2)
BASOPHILS ABSOLUTE: 0 K/UL (ref 0–0.2)
BASOPHILS RELATIVE PERCENT: 0.1 %
BASOPHILS RELATIVE PERCENT: 0.3 %
BILIRUB SERPL-MCNC: 0.3 MG/DL (ref 0–1.2)
BILIRUB SERPL-MCNC: 0.3 MG/DL (ref 0–1.2)
BUN BLDV-MCNC: 3 MG/DL (ref 6–20)
BUN BLDV-MCNC: 4 MG/DL (ref 6–20)
CALCIUM SERPL-MCNC: 8.2 MG/DL (ref 8.6–10.2)
CALCIUM SERPL-MCNC: 8.2 MG/DL (ref 8.6–10.2)
CHLORIDE BLD-SCNC: 102 MEQ/L (ref 98–107)
CHLORIDE BLD-SCNC: 105 MEQ/L (ref 98–107)
CO2: 15 MEQ/L (ref 22–29)
CO2: 19 MEQ/L (ref 22–29)
CREAT SERPL-MCNC: 0.4 MG/DL (ref 0.5–0.9)
CREAT SERPL-MCNC: 0.48 MG/DL (ref 0.5–0.9)
CREATININE 24 HOUR URINE: 1.65 G/TV (ref 0.8–1.5)
CREATININE URINE: 70.1 MG/DL
EOSINOPHILS ABSOLUTE: 0 K/UL (ref 0–0.7)
EOSINOPHILS ABSOLUTE: 0 K/UL (ref 0–0.7)
EOSINOPHILS RELATIVE PERCENT: 0 %
EOSINOPHILS RELATIVE PERCENT: 0.2 %
GFR AFRICAN AMERICAN: >60
GFR AFRICAN AMERICAN: >60
GFR NON-AFRICAN AMERICAN: >60
GFR NON-AFRICAN AMERICAN: >60
GLOBULIN: 2.9 G/DL (ref 2.3–3.5)
GLOBULIN: 3.2 G/DL (ref 2.3–3.5)
GLUCOSE BLD-MCNC: 103 MG/DL (ref 74–109)
GLUCOSE BLD-MCNC: 111 MG/DL (ref 74–109)
HCT VFR BLD CALC: 32.6 % (ref 37–47)
HCT VFR BLD CALC: 35.6 % (ref 37–47)
HEMOGLOBIN: 11 G/DL (ref 12–16)
HEMOGLOBIN: 11.8 G/DL (ref 12–16)
LYMPHOCYTES ABSOLUTE: 0.5 K/UL (ref 1–4.8)
LYMPHOCYTES ABSOLUTE: 1.1 K/UL (ref 1–4.8)
LYMPHOCYTES RELATIVE PERCENT: 4.4 %
LYMPHOCYTES RELATIVE PERCENT: 9.3 %
MCH RBC QN AUTO: 30.4 PG (ref 27–31.3)
MCH RBC QN AUTO: 30.8 PG (ref 27–31.3)
MCHC RBC AUTO-ENTMCNC: 33.2 % (ref 33–37)
MCHC RBC AUTO-ENTMCNC: 33.9 % (ref 33–37)
MCV RBC AUTO: 91 FL (ref 82–100)
MCV RBC AUTO: 91.6 FL (ref 82–100)
MONOCYTES ABSOLUTE: 0.6 K/UL (ref 0.2–0.8)
MONOCYTES ABSOLUTE: 0.9 K/UL (ref 0.2–0.8)
MONOCYTES RELATIVE PERCENT: 5.1 %
MONOCYTES RELATIVE PERCENT: 7.6 %
NEUTROPHILS ABSOLUTE: 10.9 K/UL (ref 1.4–6.5)
NEUTROPHILS ABSOLUTE: 9.9 K/UL (ref 1.4–6.5)
NEUTROPHILS RELATIVE PERCENT: 82.8 %
NEUTROPHILS RELATIVE PERCENT: 90.2 %
PDW BLD-RTO: 12.8 % (ref 11.5–14.5)
PDW BLD-RTO: 12.9 % (ref 11.5–14.5)
PLATELET # BLD: 256 K/UL (ref 130–400)
PLATELET # BLD: 266 K/UL (ref 130–400)
POTASSIUM SERPL-SCNC: 3.2 MEQ/L (ref 3.5–5.1)
POTASSIUM SERPL-SCNC: 3.4 MEQ/L (ref 3.5–5.1)
PROTEIN 24 HOUR URINE: 799 MG/TV (ref 0–200)
RBC # BLD: 3.58 M/UL (ref 4.2–5.4)
RBC # BLD: 3.89 M/UL (ref 4.2–5.4)
SODIUM BLD-SCNC: 137 MEQ/L (ref 132–144)
SODIUM BLD-SCNC: 139 MEQ/L (ref 132–144)
TOTAL PROTEIN: 6.3 G/DL (ref 6.4–8.1)
TOTAL PROTEIN: 6.3 G/DL (ref 6.4–8.1)
WBC # BLD: 12 K/UL (ref 4.8–10.8)
WBC # BLD: 12.1 K/UL (ref 4.8–10.8)

## 2019-01-04 PROCEDURE — 6370000000 HC RX 637 (ALT 250 FOR IP): Performed by: OBSTETRICS & GYNECOLOGY

## 2019-01-04 PROCEDURE — 86900 BLOOD TYPING SEROLOGIC ABO: CPT

## 2019-01-04 PROCEDURE — 36415 COLL VENOUS BLD VENIPUNCTURE: CPT

## 2019-01-04 PROCEDURE — 86901 BLOOD TYPING SEROLOGIC RH(D): CPT

## 2019-01-04 PROCEDURE — 2580000003 HC RX 258: Performed by: OBSTETRICS & GYNECOLOGY

## 2019-01-04 PROCEDURE — 1220000000 HC SEMI PRIVATE OB R&B

## 2019-01-04 PROCEDURE — 86850 RBC ANTIBODY SCREEN: CPT

## 2019-01-04 PROCEDURE — 80053 COMPREHEN METABOLIC PANEL: CPT

## 2019-01-04 PROCEDURE — 6360000002 HC RX W HCPCS: Performed by: OBSTETRICS & GYNECOLOGY

## 2019-01-04 PROCEDURE — 99283 EMERGENCY DEPT VISIT LOW MDM: CPT

## 2019-01-04 PROCEDURE — 85025 COMPLETE CBC W/AUTO DIFF WBC: CPT

## 2019-01-04 RX ORDER — BETAMETHASONE SODIUM PHOSPHATE AND BETAMETHASONE ACETATE 3; 3 MG/ML; MG/ML
12 INJECTION, SUSPENSION INTRA-ARTICULAR; INTRALESIONAL; INTRAMUSCULAR; SOFT TISSUE DAILY
Status: DISCONTINUED | OUTPATIENT
Start: 2019-01-04 | End: 2019-01-05

## 2019-01-04 RX ORDER — NALBUPHINE HCL 10 MG/ML
10 AMPUL (ML) INJECTION
Status: DISCONTINUED | OUTPATIENT
Start: 2019-01-04 | End: 2019-01-05

## 2019-01-04 RX ORDER — SODIUM CHLORIDE, SODIUM LACTATE, POTASSIUM CHLORIDE, CALCIUM CHLORIDE 600; 310; 30; 20 MG/100ML; MG/100ML; MG/100ML; MG/100ML
INJECTION, SOLUTION INTRAVENOUS CONTINUOUS
Status: DISCONTINUED | OUTPATIENT
Start: 2019-01-04 | End: 2019-01-05

## 2019-01-04 RX ORDER — DOCUSATE SODIUM 100 MG/1
100 CAPSULE, LIQUID FILLED ORAL 2 TIMES DAILY
Status: DISCONTINUED | OUTPATIENT
Start: 2019-01-04 | End: 2019-01-05

## 2019-01-04 RX ORDER — BISACODYL 10 MG
10 SUPPOSITORY, RECTAL RECTAL DAILY PRN
Status: DISCONTINUED | OUTPATIENT
Start: 2019-01-04 | End: 2019-01-05

## 2019-01-04 RX ORDER — PENICILLIN G 3000000 [IU]/50ML
3 INJECTION, SOLUTION INTRAVENOUS EVERY 4 HOURS
Status: DISCONTINUED | OUTPATIENT
Start: 2019-01-05 | End: 2019-01-05

## 2019-01-04 RX ORDER — SODIUM CHLORIDE 0.9 % (FLUSH) 0.9 %
10 SYRINGE (ML) INJECTION PRN
Status: DISCONTINUED | OUTPATIENT
Start: 2019-01-04 | End: 2019-01-05

## 2019-01-04 RX ORDER — ONDANSETRON 2 MG/ML
4 INJECTION INTRAMUSCULAR; INTRAVENOUS EVERY 6 HOURS PRN
Status: DISCONTINUED | OUTPATIENT
Start: 2019-01-04 | End: 2019-01-05

## 2019-01-04 RX ORDER — SODIUM CHLORIDE 0.9 % (FLUSH) 0.9 %
10 SYRINGE (ML) INJECTION EVERY 12 HOURS SCHEDULED
Status: DISCONTINUED | OUTPATIENT
Start: 2019-01-04 | End: 2019-01-05

## 2019-01-04 RX ORDER — FAMOTIDINE 20 MG/1
20 TABLET, FILM COATED ORAL 2 TIMES DAILY
Status: DISCONTINUED | OUTPATIENT
Start: 2019-01-04 | End: 2019-01-05

## 2019-01-04 RX ORDER — BETAMETHASONE SODIUM PHOSPHATE AND BETAMETHASONE ACETATE 3; 3 MG/ML; MG/ML
12 INJECTION, SUSPENSION INTRA-ARTICULAR; INTRALESIONAL; INTRAMUSCULAR; SOFT TISSUE DAILY
Status: DISCONTINUED | OUTPATIENT
Start: 2019-01-05 | End: 2019-01-04

## 2019-01-04 RX ORDER — SIMETHICONE 80 MG
80 TABLET,CHEWABLE ORAL EVERY 6 HOURS PRN
Status: DISCONTINUED | OUTPATIENT
Start: 2019-01-04 | End: 2019-01-05

## 2019-01-04 RX ADMIN — CEFAZOLIN 1 G: 1 INJECTION, POWDER, FOR SOLUTION INTRAMUSCULAR; INTRAVENOUS at 20:13

## 2019-01-04 RX ADMIN — FAMOTIDINE 20 MG: 20 TABLET ORAL at 13:55

## 2019-01-04 RX ADMIN — BETAMETHASONE SODIUM PHOSPHATE AND BETAMETHASONE ACETATE 12 MG: 3; 3 INJECTION, SUSPENSION INTRA-ARTICULAR; INTRALESIONAL; INTRAMUSCULAR at 23:27

## 2019-01-04 RX ADMIN — FAMOTIDINE 20 MG: 20 TABLET ORAL at 02:04

## 2019-01-04 RX ADMIN — MAGNESIUM SULFATE HEPTAHYDRATE 2 G/HR: 40 INJECTION, SOLUTION INTRAVENOUS at 23:13

## 2019-01-04 RX ADMIN — CEFAZOLIN 1 G: 1 INJECTION, POWDER, FOR SOLUTION INTRAMUSCULAR; INTRAVENOUS at 04:28

## 2019-01-04 RX ADMIN — LABETALOL HYDROCHLORIDE 200 MG: 200 TABLET, FILM COATED ORAL at 13:55

## 2019-01-04 RX ADMIN — LABETALOL HYDROCHLORIDE 200 MG: 200 TABLET, FILM COATED ORAL at 22:01

## 2019-01-04 RX ADMIN — LABETALOL HYDROCHLORIDE 200 MG: 200 TABLET, FILM COATED ORAL at 06:03

## 2019-01-04 RX ADMIN — CEFAZOLIN 1 G: 1 INJECTION, POWDER, FOR SOLUTION INTRAMUSCULAR; INTRAVENOUS at 12:10

## 2019-01-04 RX ADMIN — Medication 1 MILLI-UNITS/MIN: at 23:30

## 2019-01-04 RX ADMIN — SODIUM CHLORIDE, POTASSIUM CHLORIDE, SODIUM LACTATE AND CALCIUM CHLORIDE: 600; 310; 30; 20 INJECTION, SOLUTION INTRAVENOUS at 23:06

## 2019-01-04 RX ADMIN — SODIUM CHLORIDE, POTASSIUM CHLORIDE, SODIUM LACTATE AND CALCIUM CHLORIDE: 600; 310; 30; 20 INJECTION, SOLUTION INTRAVENOUS at 15:30

## 2019-01-04 RX ADMIN — MAGNESIUM SULFATE HEPTAHYDRATE 1 G/HR: 40 INJECTION, SOLUTION INTRAVENOUS at 19:15

## 2019-01-04 RX ADMIN — DEXTROSE MONOHYDRATE 5 MILLION UNITS: 5 INJECTION INTRAVENOUS at 23:06

## 2019-01-04 RX ADMIN — SODIUM CHLORIDE, POTASSIUM CHLORIDE, SODIUM LACTATE AND CALCIUM CHLORIDE: 600; 310; 30; 20 INJECTION, SOLUTION INTRAVENOUS at 05:00

## 2019-01-04 ASSESSMENT — PAIN SCALES - GENERAL
PAINLEVEL_OUTOF10: 0
PAINLEVEL_OUTOF10: 1
PAINLEVEL_OUTOF10: 0

## 2019-01-04 ASSESSMENT — PAIN DESCRIPTION - PAIN TYPE: TYPE: ACUTE PAIN

## 2019-01-04 ASSESSMENT — PAIN DESCRIPTION - FREQUENCY: FREQUENCY: CONTINUOUS

## 2019-01-04 ASSESSMENT — PAIN DESCRIPTION - LOCATION: LOCATION: GENERALIZED;HEAD

## 2019-01-05 ENCOUNTER — ANESTHESIA (OUTPATIENT)
Dept: LABOR AND DELIVERY | Age: 24
DRG: 560 | End: 2019-01-05
Payer: MEDICAID

## 2019-01-05 ENCOUNTER — ANESTHESIA EVENT (OUTPATIENT)
Dept: LABOR AND DELIVERY | Age: 24
DRG: 560 | End: 2019-01-05
Payer: MEDICAID

## 2019-01-05 LAB
ABO/RH: NORMAL
ABO/RH: NORMAL
ANTIBODY SCREEN: NORMAL
URINE CULTURE, ROUTINE: NORMAL

## 2019-01-05 PROCEDURE — 0HQ9XZZ REPAIR PERINEUM SKIN, EXTERNAL APPROACH: ICD-10-PCS | Performed by: OBSTETRICS & GYNECOLOGY

## 2019-01-05 PROCEDURE — 6360000002 HC RX W HCPCS: Performed by: OBSTETRICS & GYNECOLOGY

## 2019-01-05 PROCEDURE — 2580000003 HC RX 258: Performed by: OBSTETRICS & GYNECOLOGY

## 2019-01-05 PROCEDURE — 59409 OBSTETRICAL CARE: CPT | Performed by: OBSTETRICS & GYNECOLOGY

## 2019-01-05 PROCEDURE — 1220000000 HC SEMI PRIVATE OB R&B

## 2019-01-05 PROCEDURE — 3700000025 ANESTHESIA EPIDURAL BLOCK: Performed by: NURSE ANESTHETIST, CERTIFIED REGISTERED

## 2019-01-05 PROCEDURE — 2500000003 HC RX 250 WO HCPCS

## 2019-01-05 PROCEDURE — 3E033VJ INTRODUCTION OF OTHER HORMONE INTO PERIPHERAL VEIN, PERCUTANEOUS APPROACH: ICD-10-PCS | Performed by: OBSTETRICS & GYNECOLOGY

## 2019-01-05 PROCEDURE — 88307 TISSUE EXAM BY PATHOLOGIST: CPT

## 2019-01-05 PROCEDURE — 6370000000 HC RX 637 (ALT 250 FOR IP): Performed by: OBSTETRICS & GYNECOLOGY

## 2019-01-05 PROCEDURE — 2500000003 HC RX 250 WO HCPCS: Performed by: OBSTETRICS & GYNECOLOGY

## 2019-01-05 PROCEDURE — S0028 INJECTION, FAMOTIDINE, 20 MG: HCPCS | Performed by: OBSTETRICS & GYNECOLOGY

## 2019-01-05 PROCEDURE — 2500000003 HC RX 250 WO HCPCS: Performed by: NURSE ANESTHETIST, CERTIFIED REGISTERED

## 2019-01-05 PROCEDURE — 10907ZC DRAINAGE OF AMNIOTIC FLUID, THERAPEUTIC FROM PRODUCTS OF CONCEPTION, VIA NATURAL OR ARTIFICIAL OPENING: ICD-10-PCS | Performed by: OBSTETRICS & GYNECOLOGY

## 2019-01-05 RX ORDER — ACETAMINOPHEN 325 MG/1
650 TABLET ORAL EVERY 4 HOURS PRN
Status: DISCONTINUED | OUTPATIENT
Start: 2019-01-05 | End: 2019-01-09 | Stop reason: HOSPADM

## 2019-01-05 RX ORDER — CARBOPROST TROMETHAMINE 250 UG/ML
INJECTION, SOLUTION INTRAMUSCULAR
Status: COMPLETED
Start: 2019-01-05 | End: 2019-01-05

## 2019-01-05 RX ORDER — FAMOTIDINE 20 MG/1
20 TABLET, FILM COATED ORAL 2 TIMES DAILY
Status: DISCONTINUED | OUTPATIENT
Start: 2019-01-05 | End: 2019-01-09 | Stop reason: HOSPADM

## 2019-01-05 RX ORDER — KETOROLAC TROMETHAMINE 30 MG/ML
30 INJECTION, SOLUTION INTRAMUSCULAR; INTRAVENOUS EVERY 6 HOURS
Status: ACTIVE | OUTPATIENT
Start: 2019-01-05 | End: 2019-01-07

## 2019-01-05 RX ORDER — MAGNESIUM HYDROXIDE/ALUMINUM HYDROXICE/SIMETHICONE 120; 1200; 1200 MG/30ML; MG/30ML; MG/30ML
30 SUSPENSION ORAL EVERY 6 HOURS PRN
Status: DISCONTINUED | OUTPATIENT
Start: 2019-01-05 | End: 2019-01-09 | Stop reason: HOSPADM

## 2019-01-05 RX ORDER — SODIUM CHLORIDE, SODIUM LACTATE, POTASSIUM CHLORIDE, CALCIUM CHLORIDE 600; 310; 30; 20 MG/100ML; MG/100ML; MG/100ML; MG/100ML
INJECTION, SOLUTION INTRAVENOUS CONTINUOUS
Status: DISCONTINUED | OUTPATIENT
Start: 2019-01-05 | End: 2019-01-09 | Stop reason: HOSPADM

## 2019-01-05 RX ORDER — FERROUS SULFATE 325(65) MG
325 TABLET ORAL 2 TIMES DAILY WITH MEALS
Status: DISCONTINUED | OUTPATIENT
Start: 2019-01-05 | End: 2019-01-09 | Stop reason: HOSPADM

## 2019-01-05 RX ORDER — OXYCODONE HYDROCHLORIDE AND ACETAMINOPHEN 5; 325 MG/1; MG/1
1 TABLET ORAL EVERY 4 HOURS PRN
Status: DISCONTINUED | OUTPATIENT
Start: 2019-01-05 | End: 2019-01-09 | Stop reason: HOSPADM

## 2019-01-05 RX ORDER — DOCUSATE SODIUM 100 MG/1
100 CAPSULE, LIQUID FILLED ORAL 2 TIMES DAILY
Status: DISCONTINUED | OUTPATIENT
Start: 2019-01-05 | End: 2019-01-09 | Stop reason: HOSPADM

## 2019-01-05 RX ORDER — SODIUM CHLORIDE 0.9 % (FLUSH) 0.9 %
10 SYRINGE (ML) INJECTION EVERY 12 HOURS SCHEDULED
Status: DISCONTINUED | OUTPATIENT
Start: 2019-01-05 | End: 2019-01-09 | Stop reason: HOSPADM

## 2019-01-05 RX ORDER — OXYCODONE HYDROCHLORIDE AND ACETAMINOPHEN 5; 325 MG/1; MG/1
2 TABLET ORAL EVERY 4 HOURS PRN
Status: DISCONTINUED | OUTPATIENT
Start: 2019-01-05 | End: 2019-01-09 | Stop reason: HOSPADM

## 2019-01-05 RX ORDER — LANOLIN 100 %
OINTMENT (GRAM) TOPICAL PRN
Status: DISCONTINUED | OUTPATIENT
Start: 2019-01-05 | End: 2019-01-09 | Stop reason: HOSPADM

## 2019-01-05 RX ORDER — ONDANSETRON 4 MG/1
8 TABLET, FILM COATED ORAL EVERY 8 HOURS PRN
Status: DISCONTINUED | OUTPATIENT
Start: 2019-01-05 | End: 2019-01-09 | Stop reason: HOSPADM

## 2019-01-05 RX ORDER — CARBOPROST TROMETHAMINE 250 UG/ML
250 INJECTION, SOLUTION INTRAMUSCULAR
Status: ACTIVE | OUTPATIENT
Start: 2019-01-05 | End: 2019-01-05

## 2019-01-05 RX ORDER — ACETAMINOPHEN 500 MG
1000 TABLET ORAL EVERY 6 HOURS PRN
Status: DISCONTINUED | OUTPATIENT
Start: 2019-01-05 | End: 2019-01-09 | Stop reason: HOSPADM

## 2019-01-05 RX ORDER — NALOXONE HYDROCHLORIDE 0.4 MG/ML
0.4 INJECTION, SOLUTION INTRAMUSCULAR; INTRAVENOUS; SUBCUTANEOUS PRN
Status: DISCONTINUED | OUTPATIENT
Start: 2019-01-05 | End: 2019-01-05

## 2019-01-05 RX ORDER — SODIUM CHLORIDE 0.9 % (FLUSH) 0.9 %
10 SYRINGE (ML) INJECTION PRN
Status: DISCONTINUED | OUTPATIENT
Start: 2019-01-05 | End: 2019-01-09 | Stop reason: HOSPADM

## 2019-01-05 RX ORDER — IBUPROFEN 600 MG/1
600 TABLET ORAL EVERY 6 HOURS PRN
Status: DISCONTINUED | OUTPATIENT
Start: 2019-01-07 | End: 2019-01-09 | Stop reason: HOSPADM

## 2019-01-05 RX ADMIN — SODIUM CHLORIDE, POTASSIUM CHLORIDE, SODIUM LACTATE AND CALCIUM CHLORIDE: 600; 310; 30; 20 INJECTION, SOLUTION INTRAVENOUS at 13:32

## 2019-01-05 RX ADMIN — PENICILLIN G 3 MILLION UNITS: 3000000 INJECTION, SOLUTION INTRAVENOUS at 13:58

## 2019-01-05 RX ADMIN — CARBOPROST TROMETHAMINE 250 MCG: 250 INJECTION, SOLUTION INTRAMUSCULAR at 17:29

## 2019-01-05 RX ADMIN — Medication 1 MILLI-UNITS/MIN: at 14:19

## 2019-01-05 RX ADMIN — CEFAZOLIN 1 G: 1 INJECTION, POWDER, FOR SOLUTION INTRAMUSCULAR; INTRAVENOUS at 04:11

## 2019-01-05 RX ADMIN — NALBUPHINE HYDROCHLORIDE 10 MG: 10 INJECTION, SOLUTION INTRAMUSCULAR; INTRAVENOUS; SUBCUTANEOUS at 09:57

## 2019-01-05 RX ADMIN — ACETAMINOPHEN 650 MG: 325 TABLET ORAL at 20:53

## 2019-01-05 RX ADMIN — CARBOPROST TROMETHAMINE 250 MCG: 250 INJECTION, SOLUTION INTRAMUSCULAR at 17:49

## 2019-01-05 RX ADMIN — MAGNESIUM SULFATE HEPTAHYDRATE 2 G/HR: 40 INJECTION, SOLUTION INTRAVENOUS at 22:13

## 2019-01-05 RX ADMIN — FAMOTIDINE 20 MG: 10 INJECTION, SOLUTION INTRAVENOUS at 10:43

## 2019-01-05 RX ADMIN — ACETAMINOPHEN 650 MG: 325 TABLET ORAL at 00:54

## 2019-01-05 RX ADMIN — ONDANSETRON 4 MG: 2 INJECTION INTRAMUSCULAR; INTRAVENOUS at 18:07

## 2019-01-05 RX ADMIN — Medication 5 ML: at 13:12

## 2019-01-05 RX ADMIN — PENICILLIN G 3 MILLION UNITS: 3000000 INJECTION, SOLUTION INTRAVENOUS at 05:00

## 2019-01-05 RX ADMIN — MAGNESIUM SULFATE HEPTAHYDRATE 2 G/HR: 40 INJECTION, SOLUTION INTRAVENOUS at 11:04

## 2019-01-05 RX ADMIN — Medication 12 ML/HR: at 13:13

## 2019-01-05 RX ADMIN — FAMOTIDINE 20 MG: 20 TABLET ORAL at 23:11

## 2019-01-05 RX ADMIN — Medication 1 MILLI-UNITS/MIN: at 17:59

## 2019-01-05 RX ADMIN — PENICILLIN G 3 MILLION UNITS: 3000000 INJECTION, SOLUTION INTRAVENOUS at 09:11

## 2019-01-05 RX ADMIN — Medication 5 ML: at 13:07

## 2019-01-05 RX ADMIN — DOCUSATE SODIUM 100 MG: 100 CAPSULE, LIQUID FILLED ORAL at 23:12

## 2019-01-05 ASSESSMENT — PAIN SCALES - GENERAL
PAINLEVEL_OUTOF10: 3
PAINLEVEL_OUTOF10: 10
PAINLEVEL_OUTOF10: 3
PAINLEVEL_OUTOF10: 4
PAINLEVEL_OUTOF10: 10
PAINLEVEL_OUTOF10: 3
PAINLEVEL_OUTOF10: 10
PAINLEVEL_OUTOF10: 3

## 2019-01-05 ASSESSMENT — PAIN DESCRIPTION - LOCATION
LOCATION: ABDOMEN
LOCATION: HEAD

## 2019-01-06 LAB
BASOPHILS ABSOLUTE: 0 K/UL (ref 0–0.2)
BASOPHILS RELATIVE PERCENT: 0.2 %
EOSINOPHILS ABSOLUTE: 0 K/UL (ref 0–0.7)
EOSINOPHILS RELATIVE PERCENT: 0 %
HCT VFR BLD CALC: 25.7 % (ref 37–47)
HEMOGLOBIN: 8.6 G/DL (ref 12–16)
LYMPHOCYTES ABSOLUTE: 1.2 K/UL (ref 1–4.8)
LYMPHOCYTES RELATIVE PERCENT: 9.1 %
MCH RBC QN AUTO: 30.7 PG (ref 27–31.3)
MCHC RBC AUTO-ENTMCNC: 33.6 % (ref 33–37)
MCV RBC AUTO: 91.5 FL (ref 82–100)
MONOCYTES ABSOLUTE: 1.2 K/UL (ref 0.2–0.8)
MONOCYTES RELATIVE PERCENT: 8.7 %
NEUTROPHILS ABSOLUTE: 10.9 K/UL (ref 1.4–6.5)
NEUTROPHILS RELATIVE PERCENT: 82 %
PDW BLD-RTO: 12.8 % (ref 11.5–14.5)
PLATELET # BLD: 241 K/UL (ref 130–400)
RBC # BLD: 2.81 M/UL (ref 4.2–5.4)
WBC # BLD: 13.4 K/UL (ref 4.8–10.8)

## 2019-01-06 PROCEDURE — 1220000000 HC SEMI PRIVATE OB R&B

## 2019-01-06 PROCEDURE — 6360000002 HC RX W HCPCS: Performed by: OBSTETRICS & GYNECOLOGY

## 2019-01-06 PROCEDURE — 36415 COLL VENOUS BLD VENIPUNCTURE: CPT

## 2019-01-06 PROCEDURE — 2580000003 HC RX 258: Performed by: OBSTETRICS & GYNECOLOGY

## 2019-01-06 PROCEDURE — 85025 COMPLETE CBC W/AUTO DIFF WBC: CPT

## 2019-01-06 PROCEDURE — 6370000000 HC RX 637 (ALT 250 FOR IP): Performed by: OBSTETRICS & GYNECOLOGY

## 2019-01-06 RX ORDER — LABETALOL 200 MG/1
200 TABLET, FILM COATED ORAL EVERY 8 HOURS SCHEDULED
Status: DISCONTINUED | OUTPATIENT
Start: 2019-01-06 | End: 2019-01-08

## 2019-01-06 RX ADMIN — BENZOCAINE AND LEVOMENTHOL: 200; 5 SPRAY TOPICAL at 09:05

## 2019-01-06 RX ADMIN — DOCUSATE SODIUM 100 MG: 100 CAPSULE, LIQUID FILLED ORAL at 09:05

## 2019-01-06 RX ADMIN — ACETAMINOPHEN 1000 MG: 500 TABLET ORAL at 02:49

## 2019-01-06 RX ADMIN — FAMOTIDINE 20 MG: 20 TABLET ORAL at 22:01

## 2019-01-06 RX ADMIN — Medication 10 ML: at 22:01

## 2019-01-06 RX ADMIN — Medication: at 09:05

## 2019-01-06 RX ADMIN — DOCUSATE SODIUM 100 MG: 100 CAPSULE, LIQUID FILLED ORAL at 22:01

## 2019-01-06 RX ADMIN — SODIUM CHLORIDE, POTASSIUM CHLORIDE, SODIUM LACTATE AND CALCIUM CHLORIDE: 600; 310; 30; 20 INJECTION, SOLUTION INTRAVENOUS at 09:37

## 2019-01-06 RX ADMIN — FERROUS SULFATE TAB 325 MG (65 MG ELEMENTAL FE) 325 MG: 325 (65 FE) TAB at 18:15

## 2019-01-06 RX ADMIN — FAMOTIDINE 20 MG: 20 TABLET ORAL at 09:05

## 2019-01-06 RX ADMIN — MAGNESIUM SULFATE HEPTAHYDRATE 2 G/HR: 40 INJECTION, SOLUTION INTRAVENOUS at 09:36

## 2019-01-06 ASSESSMENT — PAIN SCALES - GENERAL
PAINLEVEL_OUTOF10: 0
PAINLEVEL_OUTOF10: 2

## 2019-01-07 PROCEDURE — 6370000000 HC RX 637 (ALT 250 FOR IP): Performed by: OBSTETRICS & GYNECOLOGY

## 2019-01-07 PROCEDURE — 1220000000 HC SEMI PRIVATE OB R&B

## 2019-01-07 RX ADMIN — DOCUSATE SODIUM 100 MG: 100 CAPSULE, LIQUID FILLED ORAL at 09:18

## 2019-01-07 RX ADMIN — LABETALOL HYDROCHLORIDE 200 MG: 200 TABLET, FILM COATED ORAL at 02:03

## 2019-01-07 RX ADMIN — LABETALOL HYDROCHLORIDE 200 MG: 200 TABLET, FILM COATED ORAL at 11:56

## 2019-01-07 RX ADMIN — FERROUS SULFATE TAB 325 MG (65 MG ELEMENTAL FE) 325 MG: 325 (65 FE) TAB at 09:17

## 2019-01-07 RX ADMIN — FERROUS SULFATE TAB 325 MG (65 MG ELEMENTAL FE) 325 MG: 325 (65 FE) TAB at 17:13

## 2019-01-07 RX ADMIN — IBUPROFEN 600 MG: 600 TABLET ORAL at 23:51

## 2019-01-07 RX ADMIN — FAMOTIDINE 20 MG: 20 TABLET ORAL at 09:18

## 2019-01-07 RX ADMIN — FAMOTIDINE 20 MG: 20 TABLET ORAL at 20:11

## 2019-01-07 RX ADMIN — LABETALOL HYDROCHLORIDE 200 MG: 200 TABLET, FILM COATED ORAL at 17:13

## 2019-01-07 RX ADMIN — DOCUSATE SODIUM 100 MG: 100 CAPSULE, LIQUID FILLED ORAL at 20:11

## 2019-01-07 RX ADMIN — IBUPROFEN 600 MG: 600 TABLET ORAL at 17:47

## 2019-01-07 ASSESSMENT — PAIN SCALES - GENERAL
PAINLEVEL_OUTOF10: 3
PAINLEVEL_OUTOF10: 2
PAINLEVEL_OUTOF10: 0
PAINLEVEL_OUTOF10: 4

## 2019-01-07 ASSESSMENT — PAIN DESCRIPTION - LOCATION
LOCATION: ABDOMEN
LOCATION: ABDOMEN

## 2019-01-07 ASSESSMENT — PAIN DESCRIPTION - PAIN TYPE
TYPE: ACUTE PAIN
TYPE: ACUTE PAIN

## 2019-01-07 ASSESSMENT — PAIN DESCRIPTION - DESCRIPTORS
DESCRIPTORS: CRAMPING
DESCRIPTORS: CRAMPING

## 2019-01-08 LAB
ALBUMIN SERPL-MCNC: 3.3 G/DL (ref 3.9–4.9)
ALP BLD-CCNC: 100 U/L (ref 40–130)
ALT SERPL-CCNC: 22 U/L (ref 0–33)
ANION GAP SERPL CALCULATED.3IONS-SCNC: 14 MEQ/L (ref 7–13)
AST SERPL-CCNC: 35 U/L (ref 0–35)
BASOPHILS ABSOLUTE: 0.1 K/UL (ref 0–0.2)
BASOPHILS RELATIVE PERCENT: 0.5 %
BILIRUB SERPL-MCNC: <0.2 MG/DL (ref 0–1.2)
BUN BLDV-MCNC: 8 MG/DL (ref 6–20)
CALCIUM SERPL-MCNC: 8.5 MG/DL (ref 8.6–10.2)
CHLORIDE BLD-SCNC: 105 MEQ/L (ref 98–107)
CO2: 24 MEQ/L (ref 22–29)
CREAT SERPL-MCNC: 0.53 MG/DL (ref 0.5–0.9)
EOSINOPHILS ABSOLUTE: 0.3 K/UL (ref 0–0.7)
EOSINOPHILS RELATIVE PERCENT: 2.7 %
GFR AFRICAN AMERICAN: >60
GFR NON-AFRICAN AMERICAN: >60
GLOBULIN: 2.6 G/DL (ref 2.3–3.5)
GLUCOSE BLD-MCNC: 90 MG/DL (ref 74–109)
HCT VFR BLD CALC: 25.5 % (ref 37–47)
HEMOGLOBIN: 8.5 G/DL (ref 12–16)
LYMPHOCYTES ABSOLUTE: 1.4 K/UL (ref 1–4.8)
LYMPHOCYTES RELATIVE PERCENT: 12.5 %
MCH RBC QN AUTO: 30.7 PG (ref 27–31.3)
MCHC RBC AUTO-ENTMCNC: 33.6 % (ref 33–37)
MCV RBC AUTO: 91.3 FL (ref 82–100)
MONOCYTES ABSOLUTE: 0.9 K/UL (ref 0.2–0.8)
MONOCYTES RELATIVE PERCENT: 8.2 %
NEUTROPHILS ABSOLUTE: 8.5 K/UL (ref 1.4–6.5)
NEUTROPHILS RELATIVE PERCENT: 76.1 %
PDW BLD-RTO: 12.9 % (ref 11.5–14.5)
PLATELET # BLD: 273 K/UL (ref 130–400)
POTASSIUM SERPL-SCNC: 3.5 MEQ/L (ref 3.5–5.1)
RBC # BLD: 2.79 M/UL (ref 4.2–5.4)
SODIUM BLD-SCNC: 143 MEQ/L (ref 132–144)
TOTAL PROTEIN: 5.9 G/DL (ref 6.4–8.1)
URIC ACID, SERUM: 4.6 MG/DL (ref 2.4–5.7)
WBC # BLD: 11.1 K/UL (ref 4.8–10.8)

## 2019-01-08 PROCEDURE — 85025 COMPLETE CBC W/AUTO DIFF WBC: CPT

## 2019-01-08 PROCEDURE — 1220000000 HC SEMI PRIVATE OB R&B

## 2019-01-08 PROCEDURE — 6370000000 HC RX 637 (ALT 250 FOR IP): Performed by: OBSTETRICS & GYNECOLOGY

## 2019-01-08 PROCEDURE — 80053 COMPREHEN METABOLIC PANEL: CPT

## 2019-01-08 PROCEDURE — 84550 ASSAY OF BLOOD/URIC ACID: CPT

## 2019-01-08 PROCEDURE — 36415 COLL VENOUS BLD VENIPUNCTURE: CPT

## 2019-01-08 RX ADMIN — DOCUSATE SODIUM 100 MG: 100 CAPSULE, LIQUID FILLED ORAL at 09:11

## 2019-01-08 RX ADMIN — LABETALOL HYDROCHLORIDE 200 MG: 200 TABLET, FILM COATED ORAL at 02:11

## 2019-01-08 RX ADMIN — FAMOTIDINE 20 MG: 20 TABLET ORAL at 20:45

## 2019-01-08 RX ADMIN — FERROUS SULFATE TAB 325 MG (65 MG ELEMENTAL FE) 325 MG: 325 (65 FE) TAB at 17:03

## 2019-01-08 RX ADMIN — IBUPROFEN 600 MG: 600 TABLET ORAL at 22:47

## 2019-01-08 RX ADMIN — LABETALOL HYDROCHLORIDE 200 MG: 200 TABLET, FILM COATED ORAL at 09:53

## 2019-01-08 RX ADMIN — DOCUSATE SODIUM 100 MG: 100 CAPSULE, LIQUID FILLED ORAL at 20:45

## 2019-01-08 RX ADMIN — FERROUS SULFATE TAB 325 MG (65 MG ELEMENTAL FE) 325 MG: 325 (65 FE) TAB at 09:10

## 2019-01-08 RX ADMIN — FAMOTIDINE 20 MG: 20 TABLET ORAL at 09:11

## 2019-01-08 RX ADMIN — LABETALOL HYDROCHLORIDE 200 MG: 200 TABLET, FILM COATED ORAL at 17:03

## 2019-01-08 ASSESSMENT — PAIN SCALES - GENERAL
PAINLEVEL_OUTOF10: 0
PAINLEVEL_OUTOF10: 3
PAINLEVEL_OUTOF10: 1
PAINLEVEL_OUTOF10: 0

## 2019-01-08 ASSESSMENT — PAIN DESCRIPTION - PAIN TYPE: TYPE: ACUTE PAIN

## 2019-01-08 ASSESSMENT — PAIN DESCRIPTION - DESCRIPTORS: DESCRIPTORS: CRAMPING

## 2019-01-08 ASSESSMENT — PAIN DESCRIPTION - LOCATION: LOCATION: ABDOMEN

## 2019-01-09 VITALS
BODY MASS INDEX: 35.84 KG/M2 | HEIGHT: 69 IN | OXYGEN SATURATION: 98 % | HEART RATE: 76 BPM | DIASTOLIC BLOOD PRESSURE: 80 MMHG | TEMPERATURE: 97.8 F | SYSTOLIC BLOOD PRESSURE: 150 MMHG | RESPIRATION RATE: 20 BRPM | WEIGHT: 242 LBS

## 2019-01-09 PROCEDURE — 6370000000 HC RX 637 (ALT 250 FOR IP): Performed by: OBSTETRICS & GYNECOLOGY

## 2019-01-09 PROCEDURE — 99238 HOSP IP/OBS DSCHRG MGMT 30/<: CPT | Performed by: OBSTETRICS & GYNECOLOGY

## 2019-01-09 PROCEDURE — 90715 TDAP VACCINE 7 YRS/> IM: CPT | Performed by: OBSTETRICS & GYNECOLOGY

## 2019-01-09 PROCEDURE — 7200000001 HC VAGINAL DELIVERY

## 2019-01-09 PROCEDURE — 90471 IMMUNIZATION ADMIN: CPT | Performed by: OBSTETRICS & GYNECOLOGY

## 2019-01-09 PROCEDURE — 6360000002 HC RX W HCPCS: Performed by: OBSTETRICS & GYNECOLOGY

## 2019-01-09 RX ORDER — FERROUS SULFATE 325(65) MG
325 TABLET ORAL 2 TIMES DAILY WITH MEALS
Qty: 60 TABLET | Refills: 2 | Status: SHIPPED | OUTPATIENT
Start: 2019-01-09 | End: 2020-04-13 | Stop reason: SDUPTHER

## 2019-01-09 RX ORDER — IBUPROFEN 600 MG/1
600 TABLET ORAL EVERY 6 HOURS PRN
Qty: 60 TABLET | Refills: 1 | Status: SHIPPED | OUTPATIENT
Start: 2019-01-09 | End: 2020-10-06

## 2019-01-09 RX ORDER — LABETALOL 300 MG/1
300 TABLET, FILM COATED ORAL EVERY 8 HOURS SCHEDULED
Qty: 60 TABLET | Refills: 1 | Status: SHIPPED | OUTPATIENT
Start: 2019-01-09 | End: 2020-04-13

## 2019-01-09 RX ADMIN — LABETALOL HYDROCHLORIDE 300 MG: 200 TABLET, FILM COATED ORAL at 01:51

## 2019-01-09 RX ADMIN — LABETALOL HYDROCHLORIDE 300 MG: 200 TABLET, FILM COATED ORAL at 09:39

## 2019-01-09 RX ADMIN — IBUPROFEN 600 MG: 600 TABLET ORAL at 13:09

## 2019-01-09 RX ADMIN — DOCUSATE SODIUM 100 MG: 100 CAPSULE, LIQUID FILLED ORAL at 09:38

## 2019-01-09 RX ADMIN — FERROUS SULFATE TAB 325 MG (65 MG ELEMENTAL FE) 325 MG: 325 (65 FE) TAB at 09:38

## 2019-01-09 RX ADMIN — TETANUS TOXOID, REDUCED DIPHTHERIA TOXOID AND ACELLULAR PERTUSSIS VACCINE, ADSORBED 0.5 ML: 5; 2.5; 8; 8; 2.5 SUSPENSION INTRAMUSCULAR at 13:09

## 2019-01-09 ASSESSMENT — PAIN SCALES - GENERAL: PAINLEVEL_OUTOF10: 3

## 2019-01-15 ENCOUNTER — OFFICE VISIT (OUTPATIENT)
Dept: OBGYN CLINIC | Age: 24
End: 2019-01-15

## 2019-01-15 VITALS
SYSTOLIC BLOOD PRESSURE: 114 MMHG | DIASTOLIC BLOOD PRESSURE: 82 MMHG | WEIGHT: 221 LBS | HEART RATE: 72 BPM | BODY MASS INDEX: 32.73 KG/M2 | HEIGHT: 69 IN

## 2019-01-15 PROCEDURE — 99024 POSTOP FOLLOW-UP VISIT: CPT | Performed by: OBSTETRICS & GYNECOLOGY

## 2019-02-13 ENCOUNTER — OFFICE VISIT (OUTPATIENT)
Dept: OBGYN CLINIC | Age: 24
End: 2019-02-13
Payer: MEDICAID

## 2019-02-13 VITALS
HEART RATE: 84 BPM | SYSTOLIC BLOOD PRESSURE: 110 MMHG | BODY MASS INDEX: 32.64 KG/M2 | DIASTOLIC BLOOD PRESSURE: 62 MMHG | HEIGHT: 69 IN

## 2019-02-13 DIAGNOSIS — N92.6 IRREGULAR MENSES: ICD-10-CM

## 2019-02-13 LAB
CONTROL: YES
PREGNANCY TEST URINE, POC: NORMAL

## 2019-02-13 PROCEDURE — 81025 URINE PREGNANCY TEST: CPT | Performed by: OBSTETRICS & GYNECOLOGY

## 2019-02-13 PROCEDURE — 96372 THER/PROPH/DIAG INJ SC/IM: CPT | Performed by: OBSTETRICS & GYNECOLOGY

## 2019-02-13 RX ORDER — MEDROXYPROGESTERONE ACETATE 150 MG/ML
150 INJECTION, SUSPENSION INTRAMUSCULAR ONCE
Status: COMPLETED | OUTPATIENT
Start: 2019-02-13 | End: 2019-02-13

## 2019-02-13 RX ADMIN — MEDROXYPROGESTERONE ACETATE 150 MG: 150 INJECTION, SUSPENSION INTRAMUSCULAR at 12:17

## 2020-03-30 ENCOUNTER — OFFICE VISIT (OUTPATIENT)
Dept: OBGYN CLINIC | Age: 25
End: 2020-03-30

## 2020-03-30 VITALS
DIASTOLIC BLOOD PRESSURE: 68 MMHG | HEART RATE: 100 BPM | SYSTOLIC BLOOD PRESSURE: 102 MMHG | BODY MASS INDEX: 37.18 KG/M2 | WEIGHT: 251 LBS | HEIGHT: 69 IN

## 2020-03-30 DIAGNOSIS — N91.2 AMENORRHEA: ICD-10-CM

## 2020-03-30 LAB
AMPHETAMINE SCREEN, URINE: NORMAL
BACTERIA: ABNORMAL /HPF
BARBITURATE SCREEN URINE: NORMAL
BENZODIAZEPINE SCREEN, URINE: NORMAL
BILIRUBIN URINE: NEGATIVE
BLOOD, URINE: NEGATIVE
CANNABINOID SCREEN URINE: NORMAL
CLARITY: ABNORMAL
COCAINE METABOLITE SCREEN URINE: NORMAL
COLOR: YELLOW
CRYSTALS, UA: ABNORMAL /HPF
EPITHELIAL CELLS, UA: ABNORMAL /HPF (ref 0–5)
GLUCOSE URINE: NEGATIVE MG/DL
KETONES, URINE: NEGATIVE MG/DL
LEUKOCYTE ESTERASE, URINE: ABNORMAL
Lab: NORMAL
METHADONE SCREEN, URINE: NORMAL
NITRITE, URINE: NEGATIVE
OPIATE SCREEN URINE: NORMAL
OXYCODONE URINE: NORMAL
PH UA: 5 (ref 5–9)
PHENCYCLIDINE SCREEN URINE: NORMAL
PROPOXYPHENE SCREEN: NORMAL
PROTEIN UA: NEGATIVE MG/DL
RBC UA: ABNORMAL /HPF (ref 0–5)
SPECIFIC GRAVITY UA: 1.02 (ref 1–1.03)
UROBILINOGEN, URINE: 0.2 E.U./DL
WBC UA: ABNORMAL /HPF (ref 0–5)

## 2020-03-30 PROCEDURE — 76801 OB US < 14 WKS SINGLE FETUS: CPT | Performed by: OBSTETRICS & GYNECOLOGY

## 2020-03-30 PROCEDURE — 90471 IMMUNIZATION ADMIN: CPT | Performed by: OBSTETRICS & GYNECOLOGY

## 2020-03-30 PROCEDURE — 90686 IIV4 VACC NO PRSV 0.5 ML IM: CPT | Performed by: OBSTETRICS & GYNECOLOGY

## 2020-03-30 PROCEDURE — 99204 OFFICE O/P NEW MOD 45 MIN: CPT | Performed by: OBSTETRICS & GYNECOLOGY

## 2020-03-30 RX ORDER — SIMETHICONE 80 MG
80 TABLET,CHEWABLE ORAL 4 TIMES DAILY PRN
Qty: 180 TABLET | Refills: 1 | Status: SHIPPED | OUTPATIENT
Start: 2020-03-30

## 2020-03-30 RX ORDER — FAMOTIDINE 20 MG/1
20 TABLET, FILM COATED ORAL 2 TIMES DAILY PRN
Qty: 60 TABLET | Refills: 2 | Status: SHIPPED | OUTPATIENT
Start: 2020-03-30 | End: 2020-10-06

## 2020-03-30 RX ORDER — DOCUSATE SODIUM 100 MG/1
100 CAPSULE, LIQUID FILLED ORAL 2 TIMES DAILY PRN
Qty: 60 CAPSULE | Refills: 2 | Status: SHIPPED | OUTPATIENT
Start: 2020-03-30 | End: 2020-07-07

## 2020-03-30 RX ORDER — METOCLOPRAMIDE 10 MG/1
10 TABLET ORAL EVERY 6 HOURS PRN
Qty: 30 TABLET | Refills: 0 | Status: SHIPPED | OUTPATIENT
Start: 2020-03-30 | End: 2020-04-14

## 2020-03-30 NOTE — PROGRESS NOTES
Initial OB visit      Noel Shields is a 25y.o. year old female  @ L  2020, 10.6 wks , OSWALDO 10/20/2020 by 10 wk US not commensurate with LMP . Complaints : nausea with vomiting for  Days  . POB: induced  @ 36 wks , 2019 female .  at 2 mnth due possible sepsis . PGYN: neg    PMH: obesity             PTSD due to early fetal loss . Was placed on Lexapro . PSH: neg     MEDS: Lexapro 5 mg daily stopped early in pregnancy . Drug Allergies: NKDA    SOCHX: neg x 3     FH: Mother or Father , DM No , HTN No, Other No    /68 (Site: Left Upper Arm, Position: Sitting, Cuff Size: Large Adult)   Pulse 100   Ht 5' 9\" (1.753 m)   Wt 251 lb (113.9 kg)   LMP 2020 (Approximate)   BMI 37.07 kg/m²   Past Medical History:   Diagnosis Date    Complication of anesthesia     \"doesnt work as well on me\"    Hypertension     hx of elevated B/P before pregnancy 5 yrs ago    Pregnancy induced hypertension, antepartum 1/3/2019     Past Surgical History:   Procedure Laterality Date    JOINT REPLACEMENT      screws placed in right and left hip at age 15-13yrs old         Review of Systems  Constitutional: negative  Genitourinary:see above  Integument/breast: negative  Behavioral/Psych: negative  Endocrine: negative     All other systems reviewed and are negative. Physical Exam:  /68 (Site: Left Upper Arm, Position: Sitting, Cuff Size: Large Adult)   Pulse 100   Ht 5' 9\" (1.753 m)   Wt 251 lb (113.9 kg)   LMP 2020 (Approximate)   BMI 37.07 kg/m²   Skin: Warm, dry, no lesions or rashes  Extremities: Without clubbing, cyanosis and edema. Palms and nails are normal. Ambulates without difficulty  Neurological: No gross sensory or motor deficits.   Abdomen: Soft, non-tender without masses or organomegaly  bowel sounds normoactive  External Genitalia: Normal anatomy, no lesions or masses  Pubic Hair Distribution: Normal pattern and distribution  Pelvic Floor: Normal pelvic support, no significant cystocele or rectocele  Perineum: No fissures, lesions or leukoplakia  Urethra: Normal  Vagina: Moist, pink, supple, no lesions, no abnormal discharge  Cervix: Firm, nontender, no lesions  Uterus: firm, mobile, nontender, no masses or irregularities      Assessment:   1. Amenorrhea        Plan:   Orders Placed This Encounter   Procedures    C.trachomatis N.gonorrhoeae DNA, Thin Prep     Standing Status:   Future     Standing Expiration Date:   3/30/2021    Culture, Urine     Standing Status:   Future     Standing Expiration Date:   3/30/2021     Order Specific Question:   Specify (ex-cath, midstream, cysto, etc)?      Answer:   midstream    US OB LESS THAN 14 WEEKS SINGLE OR FIRST GESTATION     Standing Status:   Future     Standing Expiration Date:   3/30/2021     Order Specific Question:   Reason for exam:     Answer:   TRANSVAG AS NEEDED    US OB Transvaginal     Standing Status:   Future     Standing Expiration Date:   3/30/2021    CBC Auto Differential     Standing Status:   Future     Standing Expiration Date:   3/30/2021    HCG, Quantitative, Pregnancy     Standing Status:   Future     Standing Expiration Date:   3/30/2021    Hemoglobinopathy Eval (Electrophoresis)     Standing Status:   Future     Standing Expiration Date:   3/30/2021    Hepatitis B Surface Antigen     Standing Status:   Future     Standing Expiration Date:   3/30/2021    HIV-1,2 Combo Ag/Ab By UMER, Reflexive Panel     Standing Status:   Future     Standing Expiration Date:   3/30/2021    RPR Reflex to Titer and TPPA     Standing Status:   Future     Standing Expiration Date:   3/30/2021    Rubella antibody, IgG     Standing Status:   Future     Standing Expiration Date:   3/30/2021    Urinalysis     Standing Status:   Future     Standing Expiration Date:   3/30/2021    Urine Drug Screen     Standing Status:   Future     Standing Expiration Date:   3/30/2021    Varicella Zoster Antibody, IgG     Standing Benefits of the above testing was reviewed. A second trimester amniocentesis was also made available to the patient. Risks, Benefits and non-invasive alternative testing was reviewed. The literature regarding a questionable link to pitocin augmentation and induction of labor, the assistance of labor contractions and the initiation of contractions to help delivery, have been reviewed with the patient regarding the increased potential of having a  with Attention Deficit Hyperactivity Disorder and or Autism. These two disorders and the ramifications of their impact on a child and the family caring for that child has been reviewed with the patient in detail. She was given the risks, benefits and alternatives of the use of this medication. She has agreed to its use in the delivery of her unborn child if needed at the time of delivery, Yes. The patient was questioned in detail regarding any genetic misnomer history, chromosomal abnormalities, or learning disabilities in  herself, the father of the baby or their families. SHE DENIED ANY HISTORY AS STATED ABOVE: Yes    Upon completion of the visit all questions were answered and the patients follow-up and testing schedule were reviewed. Prenatal vitamins were given. Initial labs drawn. Prenatal vitamins. Problem list reviewed and updated. Counseled about QUAD/ NIPT  Role of ultrasound in pregnancy discussed  Follow-up in 2 weeks  Early 1 hr OGTT - not indicated  Hep C screen indicated : no  FLU- was given  TDAP- in third trimester       Shantel Alexander M.D., DUTCHCKelvinO.G     First trim US : ( 3/30/2020 )   Li   CRL 38.6 mm  ~ 10.6 wk s  +  bpm   Adequate amie   Normal placenta     Shantel Alexander M.D., RUBY.C.O. G

## 2020-03-31 ENCOUNTER — TELEPHONE (OUTPATIENT)
Dept: FAMILY MEDICINE CLINIC | Age: 25
End: 2020-03-31

## 2020-03-31 RX ORDER — CEPHALEXIN 500 MG/1
500 CAPSULE ORAL 4 TIMES DAILY
Qty: 28 CAPSULE | Refills: 0 | Status: SHIPPED | OUTPATIENT
Start: 2020-03-31 | End: 2020-04-13

## 2020-03-31 NOTE — TELEPHONE ENCOUNTER
Patient called regarding some questions on her medications. She stated that for the labetalol and ferrous sulfate, she would need new prescriptions as she no longer has refills left from her first pregnancy. She also wants to know why the CephALEXin was prescribed for her to take. Patient may be reached at [phone].

## 2020-04-01 LAB — URINE CULTURE, ROUTINE: NORMAL

## 2020-04-01 NOTE — TELEPHONE ENCOUNTER
Labetalol is not needed , her blood pressure is normal.   Cephalexin will be prescribed if urine is positive for infection, otherwise not needed.

## 2020-04-03 DIAGNOSIS — N91.2 AMENORRHEA: ICD-10-CM

## 2020-04-03 LAB
BASOPHILS ABSOLUTE: 0 K/UL (ref 0–0.2)
BASOPHILS RELATIVE PERCENT: 0.4 %
C. TRACHOMATIS DNA,THIN PREP: NEGATIVE
EOSINOPHILS ABSOLUTE: 0.1 K/UL (ref 0–0.7)
EOSINOPHILS RELATIVE PERCENT: 0.9 %
GONADOTROPIN, CHORIONIC (HCG) QUANT: NORMAL MIU/ML
HCT VFR BLD CALC: 40.1 % (ref 37–47)
HEMOGLOBIN: 13.3 G/DL (ref 12–16)
HEPATITIS B SURFACE ANTIGEN INTERPRETATION: NORMAL
LYMPHOCYTES ABSOLUTE: 1.5 K/UL (ref 1–4.8)
LYMPHOCYTES RELATIVE PERCENT: 17.6 %
MCH RBC QN AUTO: 29.6 PG (ref 27–31.3)
MCHC RBC AUTO-ENTMCNC: 33.1 % (ref 33–37)
MCV RBC AUTO: 89.4 FL (ref 82–100)
MONOCYTES ABSOLUTE: 0.4 K/UL (ref 0.2–0.8)
MONOCYTES RELATIVE PERCENT: 5.2 %
N. GONORRHOEAE DNA, THIN PREP: NEGATIVE
NEUTROPHILS ABSOLUTE: 6.4 K/UL (ref 1.4–6.5)
NEUTROPHILS RELATIVE PERCENT: 75.9 %
PDW BLD-RTO: 14 % (ref 11.5–14.5)
PLATELET # BLD: 313 K/UL (ref 130–400)
RBC # BLD: 4.48 M/UL (ref 4.2–5.4)
RUBELLA ANTIBODY IGG: 135.5 IU/ML
WBC # BLD: 8.4 K/UL (ref 4.8–10.8)

## 2020-04-04 LAB
ABO/RH: NORMAL
ANTIBODY SCREEN: NORMAL
HIV 1,2 COMBO ANTIGEN/ANTIBODY: NEGATIVE
RPR: NORMAL

## 2020-04-05 LAB
HEMOGLOBIN A-1 QUANTITATION: 96.6 % (ref 95–97.9)
HEMOGLOBIN A2 QUANTITATION: 3.1 % (ref 2–3.5)
HEMOGLOBIN C QUANTITATION: 0 % (ref 0–0)
HEMOGLOBIN E QUANTITATION: 0 % (ref 0–0)
HEMOGLOBIN ELECTROPHORESIS: NORMAL
HEMOGLOBIN EVALUATION: NORMAL
HEMOGLOBIN F QUANTITATION: 0.3 % (ref 0–2.1)
HEMOGLOBIN OTHER: 0 % (ref 0–0)
HEMOGLOBIN S QUANTITATION: 0 % (ref 0–0)
SICKLE CELL: NORMAL
VZV IGG SER QL IA: 652.1 IV

## 2020-04-06 LAB
HPV COMMENT: NORMAL
HPV TYPE 16: NOT DETECTED
HPV TYPE 18: NOT DETECTED
HPVOH (OTHER TYPES): NOT DETECTED

## 2020-04-13 ENCOUNTER — INITIAL PRENATAL (OUTPATIENT)
Dept: OBGYN CLINIC | Age: 25
End: 2020-04-13

## 2020-04-13 ENCOUNTER — HOSPITAL ENCOUNTER (OUTPATIENT)
Dept: ULTRASOUND IMAGING | Age: 25
Discharge: HOME | End: 2020-04-15

## 2020-04-13 VITALS
BODY MASS INDEX: 37.36 KG/M2 | DIASTOLIC BLOOD PRESSURE: 70 MMHG | SYSTOLIC BLOOD PRESSURE: 112 MMHG | HEART RATE: 104 BPM | WEIGHT: 253 LBS

## 2020-04-13 PROCEDURE — 99213 OFFICE O/P EST LOW 20 MIN: CPT | Performed by: OBSTETRICS & GYNECOLOGY

## 2020-04-13 PROCEDURE — 76801 OB US < 14 WKS SINGLE FETUS: CPT

## 2020-04-13 RX ORDER — FERROUS SULFATE 325(65) MG
325 TABLET ORAL 2 TIMES DAILY WITH MEALS
Qty: 60 TABLET | Refills: 2 | Status: SHIPPED | OUTPATIENT
Start: 2020-04-13 | End: 2020-07-21 | Stop reason: SDUPTHER

## 2020-04-13 RX ORDER — ASPIRIN 81 MG/1
162 TABLET ORAL DAILY
Qty: 60 TABLET | Refills: 5 | Status: SHIPPED | OUTPATIENT
Start: 2020-04-13 | End: 2020-07-21 | Stop reason: SDUPTHER

## 2020-04-13 NOTE — PROGRESS NOTES
pregnancy        Plan:   No orders of the defined types were placed in this encounter. Orders Placed This Encounter   Medications    ferrous sulfate (IRON 325) 325 (65 Fe) MG tablet     Sig: Take 1 tablet by mouth 2 times daily (with meals)     Dispense:  60 tablet     Refill:  2    aspirin EC 81 MG EC tablet     Sig: Take 2 tablets by mouth daily     Dispense:  60 tablet     Refill:  5     Plan:   Angela Martin MD    Follow-up in 4 weeks  Early 1 hr OGTT - not indicated  Hep C screen indicated : no  FLU- was given  TDAP- in third trimester       Neal Rojas M.D., F.A.C.O.G     First trim US : ( 3/30/2020 )   Li   CRL 38.6 mm  ~ 10.6 wk s  +  bpm   Adequate amie   Normal placenta     Neal Rojas M.D., F.A.C.O. G

## 2020-04-14 RX ORDER — METOCLOPRAMIDE 10 MG/1
TABLET ORAL
Qty: 30 TABLET | Refills: 0 | Status: SHIPPED | OUTPATIENT
Start: 2020-04-14 | End: 2020-05-04

## 2020-04-16 LAB
EER NON INVASIVE PRENATAL ANEUPLOIDY: NORMAL
FETAL FRACTION: 4.4
FETAL GENDER: NORMAL
FETUS COUNT: 1
GESTATIONAL AGE (DAYS): 3
GESTATIONAL AGE(WEEKS): 11
HEIGHT: NORMAL
Lab: NORMAL
MATERNAL WEIGHT: 250
MONOSOMY X: NORMAL
REPORT FETUS GENDER: YES
TRIPLOIDY (VANISHING TWIN): NORMAL
TRISOMY 13 RISK: NORMAL
TRISOMY 18 RISK ASSESSMENT: NORMAL
TRISOMY 21 RISK: NORMAL

## 2020-05-05 ENCOUNTER — TELEPHONE (OUTPATIENT)
Dept: OBGYN CLINIC | Age: 25
End: 2020-05-05

## 2020-05-05 NOTE — TELEPHONE ENCOUNTER
Patient calling because she wanted gender results given at her next appt but there are no results in lab work

## 2020-05-05 NOTE — TELEPHONE ENCOUNTER
Patient needs redrawn due to not enough fetal DNA. Spoke with patient and made her aware of this.  Order placed

## 2020-05-07 ENCOUNTER — TELEPHONE (OUTPATIENT)
Dept: OBGYN CLINIC | Age: 25
End: 2020-05-07

## 2020-05-07 NOTE — TELEPHONE ENCOUNTER
Pt calling with c/o trouble urinating since yesterday. Tried milk of mag, not helping. I set pt up with an appointment with BERT to be evaluated.  She could not come today, was scheduled for tomorrow

## 2020-05-08 ENCOUNTER — ROUTINE PRENATAL (OUTPATIENT)
Dept: OBGYN CLINIC | Age: 25
End: 2020-05-08
Payer: COMMERCIAL

## 2020-05-08 VITALS
HEART RATE: 80 BPM | BODY MASS INDEX: 37.07 KG/M2 | WEIGHT: 251 LBS | DIASTOLIC BLOOD PRESSURE: 72 MMHG | SYSTOLIC BLOOD PRESSURE: 116 MMHG

## 2020-05-08 DIAGNOSIS — O28.5 CHROMOSOMAL ANALYSIS ABNORMAL, FETAL, AFFECTING MOTHER, ANTEPARTUM: Primary | ICD-10-CM

## 2020-05-08 DIAGNOSIS — Z34.82 ENCOUNTER FOR SUPERVISION OF OTHER NORMAL PREGNANCY IN SECOND TRIMESTER: ICD-10-CM

## 2020-05-08 DIAGNOSIS — R30.0 DYSURIA: ICD-10-CM

## 2020-05-08 LAB
BACTERIA: ABNORMAL /HPF
BILIRUBIN URINE: NEGATIVE
BLOOD, URINE: NEGATIVE
CLARITY: ABNORMAL
COLOR: YELLOW
EPITHELIAL CELLS, UA: ABNORMAL /HPF (ref 0–5)
GLUCOSE URINE: NEGATIVE MG/DL
KETONES, URINE: NEGATIVE MG/DL
LEUKOCYTE ESTERASE, URINE: ABNORMAL
NITRITE, URINE: NEGATIVE
PH UA: 7 (ref 5–9)
PROTEIN UA: ABNORMAL MG/DL
RBC UA: ABNORMAL /HPF (ref 0–5)
SPECIFIC GRAVITY UA: 1.02 (ref 1–1.03)
UROBILINOGEN, URINE: 0.2 E.U./DL
WBC UA: >100 /HPF (ref 0–5)

## 2020-05-08 PROCEDURE — G8427 DOCREV CUR MEDS BY ELIG CLIN: HCPCS | Performed by: OBSTETRICS & GYNECOLOGY

## 2020-05-08 PROCEDURE — 36415 COLL VENOUS BLD VENIPUNCTURE: CPT | Performed by: OBSTETRICS & GYNECOLOGY

## 2020-05-08 PROCEDURE — 1036F TOBACCO NON-USER: CPT | Performed by: OBSTETRICS & GYNECOLOGY

## 2020-05-08 PROCEDURE — G8419 CALC BMI OUT NRM PARAM NOF/U: HCPCS | Performed by: OBSTETRICS & GYNECOLOGY

## 2020-05-08 PROCEDURE — 0502F SUBSEQUENT PRENATAL CARE: CPT | Performed by: OBSTETRICS & GYNECOLOGY

## 2020-05-10 LAB — URINE CULTURE, ROUTINE: NORMAL

## 2020-05-20 LAB
EER NON INVASIVE PRENATAL ANEUPLOIDY: NORMAL
FETAL FRACTION: NORMAL
FETAL GENDER: NORMAL
FETUS COUNT: NORMAL
GESTATIONAL AGE (DAYS): 3
GESTATIONAL AGE(WEEKS): 16
HEIGHT: 69
Lab: NORMAL
MATERNAL WEIGHT: 251
MONOSOMY X: NORMAL
REPORT FETUS GENDER: YES
TRIPLOIDY (VANISHING TWIN): NORMAL
TRISOMY 13 RISK: NORMAL
TRISOMY 18 RISK ASSESSMENT: NORMAL
TRISOMY 21 RISK: NORMAL

## 2020-05-21 ENCOUNTER — TELEPHONE (OUTPATIENT)
Dept: OBGYN CLINIC | Age: 25
End: 2020-05-21

## 2020-05-21 NOTE — TELEPHONE ENCOUNTER
Two things. 1. Prenatal visit scheduled for 6/1/20. She has a gender reveal scheduled for Sunday but NIPT came back no result. She'd like anatomy scan ordered before next visit so she can try to get this scheduled before Sunday. 2. Please review result summary of NIPT drawn 5/8/20.

## 2020-06-01 ENCOUNTER — ROUTINE PRENATAL (OUTPATIENT)
Dept: OBGYN CLINIC | Age: 25
End: 2020-06-01

## 2020-06-01 VITALS
BODY MASS INDEX: 37.51 KG/M2 | DIASTOLIC BLOOD PRESSURE: 76 MMHG | WEIGHT: 254 LBS | SYSTOLIC BLOOD PRESSURE: 110 MMHG | HEART RATE: 76 BPM

## 2020-06-01 DIAGNOSIS — O28.5 CHROMOSOMAL ANALYSIS ABNORMAL, FETAL, AFFECTING MOTHER, ANTEPARTUM: ICD-10-CM

## 2020-06-01 DIAGNOSIS — Z34.80 SUPERVISION OF OTHER NORMAL PREGNANCY: ICD-10-CM

## 2020-06-01 PROCEDURE — G8427 DOCREV CUR MEDS BY ELIG CLIN: HCPCS | Performed by: OBSTETRICS & GYNECOLOGY

## 2020-06-01 PROCEDURE — 1036F TOBACCO NON-USER: CPT | Performed by: OBSTETRICS & GYNECOLOGY

## 2020-06-01 PROCEDURE — G8419 CALC BMI OUT NRM PARAM NOF/U: HCPCS | Performed by: OBSTETRICS & GYNECOLOGY

## 2020-06-01 PROCEDURE — 0502F SUBSEQUENT PRENATAL CARE: CPT | Performed by: OBSTETRICS & GYNECOLOGY

## 2020-06-01 RX ORDER — OMEPRAZOLE 20 MG/1
20 CAPSULE, DELAYED RELEASE ORAL 2 TIMES DAILY
Qty: 30 CAPSULE | Refills: 3 | Status: SHIPPED | OUTPATIENT
Start: 2020-06-01 | End: 2020-07-21 | Stop reason: SDUPTHER

## 2020-06-01 NOTE — PROGRESS NOTES
OB visit      Jesus Vaz is a 25y.o. year old female  @ L  2020, 19.6 wks , OSWALDO 10/20/2020 by 10 wk US not commensurate with LMP . Complaints : nausea with vomiting for  Days  . POB: induced  @ 36 wks , 2019 female . Complicated with severe pre-eclampsiaComplicated  at 2 mnth due possible sepsis . 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 Was placed on Lexapro . PSH: neg     MEDS: Lexapro 5 mg daily - stopped early in pregnancy . Drug Allergies: NKDA    SOCHX: neg x 3     FH: Mother or Father , DM No , HTN No, Other No    /76   Pulse 76   Wt 254 lb (115.2 kg)   LMP 2020 (Approximate)   BMI 37.51 kg/m²   Past Medical History:   Diagnosis Date    Complication of anesthesia     \"doesnt work as well on me\"    Hypertension     hx of elevated B/P before pregnancy 5 yrs ago    Pregnancy induced hypertension, antepartum 1/3/2019    PTSD (post-traumatic stress disorder)     Following death of child. Past Surgical History:   Procedure Laterality Date    JOINT REPLACEMENT      screws placed in right and left hip at age 15-13yrs old         Review of Systems  Constitutional: negative  Genitourinary:see above  Integument/breast: negative  Behavioral/Psych: negative  Endocrine: negative     All other systems reviewed and are negative. Physical Exam:  /76   Pulse 76   Wt 254 lb (115.2 kg)   LMP 2020 (Approximate)   BMI 37.51 kg/m²   Skin: Warm, dry, no lesions or rashes  Extremities: Without clubbing, cyanosis and edema. Palms and nails are normal. Ambulates without difficulty  Neurological: No gross sensory or motor deficits. Abdomen: Soft, non-tender without masses or organomegaly  bowel sounds normoactive    HOF : 20 cm     FHT : 152 bpm     Assessment:   1. Supervision of other normal pregnancy    2. Chromosomal analysis abnormal, fetal, affecting mother, antepartum    3. Slow transit constipation    4.

## 2020-06-02 RX ORDER — METOCLOPRAMIDE 10 MG/1
10 TABLET ORAL EVERY 6 HOURS PRN
Qty: 30 TABLET | Refills: 1 | Status: SHIPPED | OUTPATIENT
Start: 2020-06-02 | End: 2020-07-07

## 2020-06-04 LAB
AFP INTERPRETATION: NORMAL
AFP MOM: 1.06
AFP SPECIMEN: NORMAL
D-INHIBIN: 87 PG/ML
DATING: NORMAL
EER MATERNAL SCREEN AFP, HCG, EST, INH: NORMAL
ESTIMATED DUE DATE: NORMAL
FETUS COUNT: NORMAL
GESTATIONAL AGE CALC AT COLLECT: NORMAL
HISTORY OF ANEUPLOIDY?: NO
HISTORY/NEURAL TUBE DEFECTS: NO
INSULIN DEP. DIABETIC: NO
MATERNAL AGE AT EDD: 25.1 YR
MATERNAL WEIGHT: NORMAL
MOM FOR HCG, 2ND TRIMESTER: 1.16
MOM FOR UE3: 1.05
MOM INHIBN: 0.63
PATIENT'S HCG, 2ND TRIMESTER: NORMAL IU/L
PT AFP: 41 NG/ML
PT UE3: 1.96 NG/ML
RACE: NORMAL
SMOKING: NO

## 2020-06-15 ENCOUNTER — TELEPHONE (OUTPATIENT)
Dept: OBGYN CLINIC | Age: 25
End: 2020-06-15

## 2020-07-06 ENCOUNTER — ROUTINE PRENATAL (OUTPATIENT)
Dept: OBGYN CLINIC | Age: 25
End: 2020-07-06

## 2020-07-06 VITALS
SYSTOLIC BLOOD PRESSURE: 100 MMHG | DIASTOLIC BLOOD PRESSURE: 66 MMHG | HEART RATE: 80 BPM | WEIGHT: 258 LBS | BODY MASS INDEX: 38.1 KG/M2

## 2020-07-06 PROCEDURE — 0502F SUBSEQUENT PRENATAL CARE: CPT | Performed by: OBSTETRICS & GYNECOLOGY

## 2020-07-06 RX ORDER — METOCLOPRAMIDE 10 MG/1
10 TABLET ORAL EVERY 6 HOURS PRN
Qty: 30 TABLET | Refills: 2 | Status: ON HOLD | OUTPATIENT
Start: 2020-07-06 | End: 2020-10-15 | Stop reason: HOSPADM

## 2020-07-06 RX ORDER — DOCUSATE SODIUM 100 MG/1
100 CAPSULE, LIQUID FILLED ORAL 2 TIMES DAILY PRN
Qty: 60 CAPSULE | Refills: 2 | Status: SHIPPED | OUTPATIENT
Start: 2020-07-06

## 2020-07-06 NOTE — PROGRESS NOTES
OB visit      Donavan Young is a 25y.o. year old female  @ L  2020, 24.6 wks , OSWALDO 10/20/2020 by 10 wk US not commensurate with LMP . Complaints : nausea with vomiting for  Days  . POB: induced  @ 36 wks , 2019 female . Complicated with severe pre-eclampsiaComplicated  at 2 mnth due possible sepsis . 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 Was placed on Lexapro . PSH: neg     MEDS: Lexapro 5 mg daily - stopped early in pregnancy . Drug Allergies: NKDA    SOCHX: neg x 3     FH: Mother or Father , DM No , HTN No, Other No    /66   Pulse 80   Wt 258 lb (117 kg)   LMP 2020 (Approximate)   BMI 38.10 kg/m²   Past Medical History:   Diagnosis Date    Complication of anesthesia     \"doesnt work as well on me\"    Hypertension     hx of elevated B/P before pregnancy 5 yrs ago    Pregnancy induced hypertension, antepartum 1/3/2019    PTSD (post-traumatic stress disorder)     Following death of child. Past Surgical History:   Procedure Laterality Date    JOINT REPLACEMENT      screws placed in right and left hip at age 15-13yrs old         Review of Systems  Constitutional: negative  Genitourinary:see above  Integument/breast: negative  Behavioral/Psych: negative  Endocrine: negative     All other systems reviewed and are negative. Physical Exam:  /66   Pulse 80   Wt 258 lb (117 kg)   LMP 2020 (Approximate)   BMI 38.10 kg/m²   Skin: Warm, dry, no lesions or rashes  Extremities: Without clubbing, cyanosis and edema. Palms and nails are normal. Ambulates without difficulty  Neurological: No gross sensory or motor deficits. Abdomen: Soft, non-tender without masses or organomegaly  bowel sounds normoactive    HOF : 20 cm     FHT : 152 bpm     Assessment:   1. Supervision of other normal pregnancy    2. 24 weeks gestation of pregnancy    3. Slow transit constipation    4. Anemia during pregnancy    5.  Obesity during pregnancy        Plan:     NIPT neg results x 2   Referral to Mushtaq Trinh  for anatomy scheduled with [facility] for heart burn   Colace for constipation     No orders of the defined types were placed in this encounter. Orders Placed This Encounter   Medications    metoclopramide (REGLAN) 10 MG tablet     Sig: Take 1 tablet by mouth every 6 hours as needed (N/V)     Dispense:  30 tablet     Refill:  2    docusate sodium (COLACE) 100 MG capsule     Sig: Take 1 capsule by mouth 2 times daily as needed for Constipation     Dispense:  60 capsule     Refill:  2     Plan:   Bola Jara MD    Follow-up in 4 weeks  Early 1 hr OGTT - not indicated  Hep C screen indicated : no  FLU- was given  TDAP- in third trimester       Aby Mckeon M.D., F.A.C.O.G     First trim US : ( 3/30/2020 )   Li   CRL 38.6 mm  ~ 10.6 wk s  +  bpm   Adequate amie   Normal placenta     Aby Mckeon M.D., F.A.C.O. G

## 2020-07-07 RX ORDER — DOCUSATE SODIUM 100 MG/1
CAPSULE, LIQUID FILLED ORAL
Qty: 60 CAPSULE | Refills: 2 | Status: SHIPPED | OUTPATIENT
Start: 2020-07-07 | End: 2020-10-06 | Stop reason: SDUPTHER

## 2020-07-07 RX ORDER — METOCLOPRAMIDE 10 MG/1
10 TABLET ORAL EVERY 6 HOURS PRN
Qty: 30 TABLET | Refills: 1 | Status: SHIPPED | OUTPATIENT
Start: 2020-07-07 | End: 2020-10-06

## 2020-07-21 ENCOUNTER — TELEPHONE (OUTPATIENT)
Dept: OBGYN CLINIC | Age: 25
End: 2020-07-21

## 2020-07-21 RX ORDER — FERROUS SULFATE 325(65) MG
325 TABLET ORAL 2 TIMES DAILY WITH MEALS
Qty: 60 TABLET | Refills: 2 | Status: SHIPPED | OUTPATIENT
Start: 2020-07-21

## 2020-07-21 RX ORDER — OMEPRAZOLE 20 MG/1
20 CAPSULE, DELAYED RELEASE ORAL 2 TIMES DAILY
Qty: 30 CAPSULE | Refills: 3 | Status: ON HOLD | OUTPATIENT
Start: 2020-07-21 | End: 2022-05-09 | Stop reason: HOSPADM

## 2020-07-21 RX ORDER — ASPIRIN 81 MG/1
162 TABLET ORAL DAILY
Qty: 60 TABLET | Refills: 5 | Status: ON HOLD | OUTPATIENT
Start: 2020-07-21 | End: 2020-10-15 | Stop reason: HOSPADM

## 2020-07-21 NOTE — TELEPHONE ENCOUNTER
Pt switched pharmacies and she is needing refills on ferrous sulfate, baby aspirin, and omeprazole sent to [facility] on [address].

## 2020-08-03 ENCOUNTER — ROUTINE PRENATAL (OUTPATIENT)
Dept: OBGYN CLINIC | Age: 25
End: 2020-08-03

## 2020-08-03 VITALS
DIASTOLIC BLOOD PRESSURE: 62 MMHG | SYSTOLIC BLOOD PRESSURE: 114 MMHG | HEART RATE: 84 BPM | BODY MASS INDEX: 38.1 KG/M2 | WEIGHT: 258 LBS

## 2020-08-03 PROCEDURE — 1036F TOBACCO NON-USER: CPT | Performed by: OBSTETRICS & GYNECOLOGY

## 2020-08-03 PROCEDURE — G8427 DOCREV CUR MEDS BY ELIG CLIN: HCPCS | Performed by: OBSTETRICS & GYNECOLOGY

## 2020-08-03 PROCEDURE — 0502F SUBSEQUENT PRENATAL CARE: CPT | Performed by: OBSTETRICS & GYNECOLOGY

## 2020-08-03 PROCEDURE — G8419 CALC BMI OUT NRM PARAM NOF/U: HCPCS | Performed by: OBSTETRICS & GYNECOLOGY

## 2020-08-03 NOTE — PROGRESS NOTES
OB visit      Jenna Barboza is a 22y.o. year old female  @ L  2020, 28.6 wks , OSWALDO 10/20/2020 by 10 wk US not commensurate with LMP . Complaints : nausea with vomiting for  Days  . POB: induced  @ 36 wks , 2019 female . Complicated with severe pre-eclampsiaComplicated  at 2 mnth due possible sepsis . 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 Was placed on Lexapro . PSH: neg     MEDS: Lexapro 5 mg daily - stopped early in pregnancy . Drug Allergies: NKDA    SOCHX: neg x 3     FH: Mother or Father , DM No , HTN No, Other No    /62   Pulse 84   Wt 258 lb (117 kg)   LMP 2020 (Approximate)   BMI 38.10 kg/m²   Past Medical History:   Diagnosis Date    Complication of anesthesia     \"doesnt work as well on me\"    Elevated 1hr, Normal 3hr 2020    Hypertension     hx of elevated B/P before pregnancy 5 yrs ago    Pregnancy induced hypertension, antepartum 1/3/2019    PTSD (post-traumatic stress disorder)     Following death of child. Past Surgical History:   Procedure Laterality Date    JOINT REPLACEMENT      screws placed in right and left hip at age 15-13yrs old         Review of Systems  Constitutional: negative  Genitourinary:see above  Integument/breast: negative  Behavioral/Psych: negative  Endocrine: negative     All other systems reviewed and are negative. Physical Exam:  /62   Pulse 84   Wt 258 lb (117 kg)   LMP 2020 (Approximate)   BMI 38.10 kg/m²   Skin: Warm, dry, no lesions or rashes  Extremities: Without clubbing, cyanosis and edema. Palms and nails are normal. Ambulates without difficulty  Neurological: No gross sensory or motor deficits. Abdomen: Soft, non-tender without masses or organomegaly  bowel sounds normoactive    HOF : 29 cm     FHT : 152 bpm     Assessment:   1. Supervision of other normal pregnancy    2. 28 weeks gestation of pregnancy    3. Slow transit constipation    4. Anemia during pregnancy    5. Obesity during pregnancy    6. Chromosomal analysis abnormal, fetal, affecting mother, antepartum        Plan:     NIPT neg results x 2   Referral to [facility]   US for anatomy scheduled with [facility]   prilosec for heart burn   Colace for constipation     Orders Placed This Encounter   Procedures    Glucose Tolerance, 1 Hour     Standing Status:   Future     Number of Occurrences:   1     Standing Expiration Date:   8/3/2021    CBC Auto Differential     Standing Status:   Future     Number of Occurrences:   1     Standing Expiration Date:   8/3/2021        No orders of the defined types were placed in this encounter. Plan:   Sweetie oRsado MD    Follow-up in 4 weeks  Early 1 hr OGTT - not indicated  Hep C screen indicated : no  FLU- was given  TDAP- in third trimester       Britton Jair ESPINOZA, F.A.C.O.G     First trim US : ( 3/30/2020 )   Li   CRL 38.6 mm  ~ 10.6 wk s  +  bpm   Adequate amie   Normal placenta     Britton Jair ESPINOZA, F.A.C.O. G

## 2020-08-04 DIAGNOSIS — Z34.80 SUPERVISION OF OTHER NORMAL PREGNANCY: ICD-10-CM

## 2020-08-04 DIAGNOSIS — Z3A.28 28 WEEKS GESTATION OF PREGNANCY: ICD-10-CM

## 2020-08-04 LAB
BASOPHILS ABSOLUTE: 0 K/UL (ref 0–0.2)
BASOPHILS RELATIVE PERCENT: 0.2 %
EOSINOPHILS ABSOLUTE: 0.1 K/UL (ref 0–0.7)
EOSINOPHILS RELATIVE PERCENT: 0.8 %
GLUCOSE, 1HR PP: 190 MG/DL (ref 60–140)
HCT VFR BLD CALC: 35.8 % (ref 37–47)
HEMOGLOBIN: 11.6 G/DL (ref 12–16)
LYMPHOCYTES ABSOLUTE: 1.5 K/UL (ref 1–4.8)
LYMPHOCYTES RELATIVE PERCENT: 12.2 %
MCH RBC QN AUTO: 30.5 PG (ref 27–31.3)
MCHC RBC AUTO-ENTMCNC: 32.5 % (ref 33–37)
MCV RBC AUTO: 94 FL (ref 82–100)
MONOCYTES ABSOLUTE: 0.7 K/UL (ref 0.2–0.8)
MONOCYTES RELATIVE PERCENT: 5.5 %
NEUTROPHILS ABSOLUTE: 9.6 K/UL (ref 1.4–6.5)
NEUTROPHILS RELATIVE PERCENT: 81.3 %
PDW BLD-RTO: 13 % (ref 11.5–14.5)
PLATELET # BLD: 246 K/UL (ref 130–400)
RBC # BLD: 3.8 M/UL (ref 4.2–5.4)
WBC # BLD: 11.9 K/UL (ref 4.8–10.8)

## 2020-08-05 PROBLEM — Z3A.28 28 WEEKS GESTATION OF PREGNANCY: Status: RESOLVED | Noted: 2018-11-16 | Resolved: 2020-08-05

## 2020-08-05 PROBLEM — Z3A.32 32 WEEKS GESTATION OF PREGNANCY: Status: RESOLVED | Noted: 2018-12-07 | Resolved: 2020-08-05

## 2020-08-05 PROBLEM — Z3A.25 25 WEEKS GESTATION OF PREGNANCY: Status: RESOLVED | Noted: 2018-10-19 | Resolved: 2020-08-05

## 2020-08-05 PROBLEM — Z34.02 ENCOUNTER FOR SUPERVISION OF NORMAL FIRST PREGNANCY IN SECOND TRIMESTER: Status: RESOLVED | Noted: 2018-08-20 | Resolved: 2020-08-05

## 2020-08-05 PROBLEM — N91.2 AMENORRHEA: Status: RESOLVED | Noted: 2018-09-03 | Resolved: 2020-08-05

## 2020-08-05 PROBLEM — Z34.03 ENCOUNTER FOR SUPERVISION OF NORMAL FIRST PREGNANCY IN THIRD TRIMESTER: Status: RESOLVED | Noted: 2018-11-16 | Resolved: 2020-08-05

## 2020-08-05 PROBLEM — O99.810 ABNORMAL GLUCOSE TOLERANCE TEST (GTT) DURING PREGNANCY, ANTEPARTUM: Status: ACTIVE | Noted: 2020-08-05

## 2020-08-05 PROBLEM — Z3A.20 20 WEEKS GESTATION OF PREGNANCY: Status: RESOLVED | Noted: 2018-09-17 | Resolved: 2020-08-05

## 2020-08-18 ENCOUNTER — ROUTINE PRENATAL (OUTPATIENT)
Dept: OBGYN CLINIC | Age: 25
End: 2020-08-18

## 2020-08-18 VITALS
DIASTOLIC BLOOD PRESSURE: 70 MMHG | HEART RATE: 69 BPM | WEIGHT: 262 LBS | BODY MASS INDEX: 38.69 KG/M2 | SYSTOLIC BLOOD PRESSURE: 116 MMHG

## 2020-08-18 DIAGNOSIS — O99.810 ABNORMAL GLUCOSE TOLERANCE TEST (GTT) DURING PREGNANCY, ANTEPARTUM: ICD-10-CM

## 2020-08-18 DIAGNOSIS — Z34.83 ENCOUNTER FOR SUPERVISION OF OTHER NORMAL PREGNANCY IN THIRD TRIMESTER: ICD-10-CM

## 2020-08-18 LAB
GLUCOSE FASTING: 91 MG/DL (ref 0–95)
GLUCOSE TOLERANCE TEST 1 HOUR: 192 MG/DL (ref 0–180)
GLUCOSE TOLERANCE TEST 2 HOUR: 129 MG/DL (ref 0–155)
GLUCOSE TOLERANCE TEST 3 HOUR: 62 MG/DL (ref 0–140)

## 2020-08-18 PROCEDURE — G8427 DOCREV CUR MEDS BY ELIG CLIN: HCPCS | Performed by: OBSTETRICS & GYNECOLOGY

## 2020-08-18 PROCEDURE — 0502F SUBSEQUENT PRENATAL CARE: CPT | Performed by: OBSTETRICS & GYNECOLOGY

## 2020-08-18 PROCEDURE — 1036F TOBACCO NON-USER: CPT | Performed by: OBSTETRICS & GYNECOLOGY

## 2020-08-18 PROCEDURE — G8419 CALC BMI OUT NRM PARAM NOF/U: HCPCS | Performed by: OBSTETRICS & GYNECOLOGY

## 2020-08-18 NOTE — PROGRESS NOTES
Patient's last menstrual period was 01/21/2020 (approximate). Please reference prenatal and OB flow chart for further information  PT here today for routine prenatal care  Pt endorses fetal movement and denies loss of fluid, contractions or vaginal bleeding  Pt without complaints  ROS:  Pt denies headache, dysuria, nausea/vomiting  PE:  /70   Pulse 69   Wt 262 lb (118.8 kg)   LMP 01/21/2020 (Approximate)   BMI 38.69 kg/m²   Gen - Alert and oriented x 3  HEENT- NC/AT, CVS - RRR, Lungs - CTAB  Abd - FH Appropriate fetal growth  LE no edema  Reassuring fetal status at this time     Diagnosis Orders   1. Encounter for supervision of other normal pregnancy in third trimester  US OB 1 OR MORE FETUS LIMITED   2. 31 weeks gestation of pregnancy  US OB 1 OR MORE FETUS LIMITED   3. Abnormal glucose tolerance test (GTT) during pregnancy, antepartum         Upon completion of the visit all questions were answered and the patients follow-up and testing schedule were reviewed.

## 2020-09-01 ENCOUNTER — ROUTINE PRENATAL (OUTPATIENT)
Dept: OBGYN CLINIC | Age: 25
End: 2020-09-01

## 2020-09-01 VITALS
HEART RATE: 100 BPM | DIASTOLIC BLOOD PRESSURE: 62 MMHG | BODY MASS INDEX: 38.4 KG/M2 | SYSTOLIC BLOOD PRESSURE: 108 MMHG | WEIGHT: 260 LBS

## 2020-09-01 PROCEDURE — 1036F TOBACCO NON-USER: CPT | Performed by: OBSTETRICS & GYNECOLOGY

## 2020-09-01 PROCEDURE — G8427 DOCREV CUR MEDS BY ELIG CLIN: HCPCS | Performed by: OBSTETRICS & GYNECOLOGY

## 2020-09-01 PROCEDURE — 0502F SUBSEQUENT PRENATAL CARE: CPT | Performed by: OBSTETRICS & GYNECOLOGY

## 2020-09-01 PROCEDURE — G8419 CALC BMI OUT NRM PARAM NOF/U: HCPCS | Performed by: OBSTETRICS & GYNECOLOGY

## 2020-09-01 NOTE — PROGRESS NOTES
OB visit      Forrest Darby is a 22y.o. year old female  @ L  2020, 33 wks , OSWALDO 10/20/2020 by 10 wk US not commensurate with LMP . Complaints : nausea with vomiting for  Days  . POB: induced  @ 36 wks , 2019 female . Complicated with severe pre-eclampsiaComplicated  at 2 mnth due possible sepsis . 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 Was placed on Lexapro . PSH: neg     MEDS: Lexapro 5 mg daily - stopped early in pregnancy . Drug Allergies: NKDA    SOCHX: neg x 3     FH: Mother or Father , DM No , HTN No, Other No    /62   Pulse 100   Wt 260 lb (117.9 kg)   LMP 2020 (Approximate)   BMI 38.40 kg/m²   Past Medical History:   Diagnosis Date    Complication of anesthesia     \"doesnt work as well on me\"    Elevated 1hr, Normal 3hr 2020    Hypertension     hx of elevated B/P before pregnancy 5 yrs ago    Pregnancy induced hypertension, antepartum 1/3/2019    PTSD (post-traumatic stress disorder)     Following death of child. Past Surgical History:   Procedure Laterality Date    JOINT REPLACEMENT      screws placed in right and left hip at age 15-13yrs old         Review of Systems  Constitutional: negative  Genitourinary:see above  Integument/breast: negative  Behavioral/Psych: negative  Endocrine: negative     All other systems reviewed and are negative. Physical Exam:  /62   Pulse 100   Wt 260 lb (117.9 kg)   LMP 2020 (Approximate)   BMI 38.40 kg/m²   Skin: Warm, dry, no lesions or rashes  Extremities: Without clubbing, cyanosis and edema. Palms and nails are normal. Ambulates without difficulty  Neurological: No gross sensory or motor deficits. Abdomen: Soft, non-tender without masses or organomegaly  bowel sounds normoactive    HOF : 33 cm     FHT : 152 bpm     Assessment:   1. Encounter for supervision of other normal pregnancy in third trimester    2. 33 weeks gestation of pregnancy    3. Supervision of other normal pregnancy    4. Abnormal glucose tolerance test (GTT) during pregnancy, antepartum    5. Anemia during pregnancy    6. Slow transit constipation    7. Obesity during pregnancy        Plan:     NIPT neg results x 2 . QUAD screen wnl . S/P Referral to [facility] - PATIENT Re-assured. Anatomy US wnl. Scheduled for growth scan on 9/18/2020  prilosec for heart burn   Colace for constipation     No orders of the defined types were placed in this encounter. No orders of the defined types were placed in this encounter. Plan:   Lelia Hernandez MD    Follow-up in 2 weeks  Early 1 hr OGTT - not indicated  Hep C screen indicated : no  FLU- was given  TDAP- in third trimester       Maryellen Pichardo M.D., F.A.C.O.G     First trim US : ( 3/30/2020 )   Li   CRL 38.6 mm  ~ 10.6 wk s  +  bpm   Adequate amie   Normal placenta     Maryellen Pichardo M.D., F.A.C.O. G

## 2020-09-15 ENCOUNTER — ROUTINE PRENATAL (OUTPATIENT)
Dept: OBGYN CLINIC | Age: 25
End: 2020-09-15

## 2020-09-15 VITALS
BODY MASS INDEX: 38.54 KG/M2 | HEART RATE: 80 BPM | DIASTOLIC BLOOD PRESSURE: 68 MMHG | SYSTOLIC BLOOD PRESSURE: 102 MMHG | WEIGHT: 261 LBS

## 2020-09-15 PROCEDURE — 1036F TOBACCO NON-USER: CPT | Performed by: OBSTETRICS & GYNECOLOGY

## 2020-09-15 PROCEDURE — 0502F SUBSEQUENT PRENATAL CARE: CPT | Performed by: OBSTETRICS & GYNECOLOGY

## 2020-09-15 PROCEDURE — G8419 CALC BMI OUT NRM PARAM NOF/U: HCPCS | Performed by: OBSTETRICS & GYNECOLOGY

## 2020-09-15 PROCEDURE — G8427 DOCREV CUR MEDS BY ELIG CLIN: HCPCS | Performed by: OBSTETRICS & GYNECOLOGY

## 2020-09-15 NOTE — PROGRESS NOTES
during pregnancy, antepartum    3. 35 weeks gestation of pregnancy    4. Anemia during pregnancy    5. Slow transit constipation    6. Obesity during pregnancy        Plan:     NIPT neg results x 2 . QUAD screen wnl . S/P Referral to [facility] - PATIENT Re-assured. Anatomy US wnl. Scheduled for growth scan on 9/18/2020  [facility]   Colace for constipation     No orders of the defined types were placed in this encounter. No orders of the defined types were placed in this encounter. Plan:   Lj Osorio MD    Follow-up in 1 weeks  Early 1 hr OGTT - not indicated  Hep C screen indicated : no  FLU- was given  TDAP- in third trimester       Eulogio Whitley M.D., F.A.C.O.G     First trim US : ( 3/30/2020 )   Li   CRL 38.6 mm  ~ 10.6 wk s  +  bpm   Adequate amie   Normal placenta     Eulogio Whitley M.D., F.A.C.O. G

## 2020-09-18 ENCOUNTER — HOSPITAL ENCOUNTER (OUTPATIENT)
Dept: ULTRASOUND IMAGING | Age: 25
Discharge: HOME | End: 2020-09-20
Payer: COMMERCIAL

## 2020-09-18 PROCEDURE — 76815 OB US LIMITED FETUS(S): CPT

## 2020-09-22 ENCOUNTER — ROUTINE PRENATAL (OUTPATIENT)
Dept: OBGYN CLINIC | Age: 25
End: 2020-09-22

## 2020-09-22 VITALS
WEIGHT: 267 LBS | HEART RATE: 96 BPM | SYSTOLIC BLOOD PRESSURE: 108 MMHG | DIASTOLIC BLOOD PRESSURE: 64 MMHG | BODY MASS INDEX: 39.43 KG/M2

## 2020-09-22 DIAGNOSIS — Z34.83 ENCOUNTER FOR SUPERVISION OF OTHER NORMAL PREGNANCY IN THIRD TRIMESTER: ICD-10-CM

## 2020-09-22 PROCEDURE — 1036F TOBACCO NON-USER: CPT | Performed by: OBSTETRICS & GYNECOLOGY

## 2020-09-22 PROCEDURE — 0502F SUBSEQUENT PRENATAL CARE: CPT | Performed by: OBSTETRICS & GYNECOLOGY

## 2020-09-22 PROCEDURE — G8427 DOCREV CUR MEDS BY ELIG CLIN: HCPCS | Performed by: OBSTETRICS & GYNECOLOGY

## 2020-09-22 PROCEDURE — G8419 CALC BMI OUT NRM PARAM NOF/U: HCPCS | Performed by: OBSTETRICS & GYNECOLOGY

## 2020-09-22 NOTE — PROGRESS NOTES
OB visit      eBlem Eng is a 22y.o. year old female  @ L  2020, 36 wks , OSWALDO 10/20/2020 by 10 wk US not commensurate with LMP . Complaints : nausea with vomiting for  Days  . POB: induced  @ 36 wks , 2019 female . Complicated with severe pre-eclampsiaComplicated  at 2 mnth due possible sepsis . 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 Was placed on Lexapro . PSH: neg     MEDS: Lexapro 5 mg daily - stopped early in pregnancy . Drug Allergies: NKDA    SOCHX: neg x 3     FH: Mother or Father , DM No , HTN No, Other No    /64   Pulse 96   Wt 267 lb (121.1 kg)   LMP 2020 (Approximate)   BMI 39.43 kg/m²   Past Medical History:   Diagnosis Date    Complication of anesthesia     \"doesnt work as well on me\"    Elevated 1hr, Normal 3hr 2020    Hypertension     hx of elevated B/P before pregnancy 5 yrs ago    Pregnancy induced hypertension, antepartum 1/3/2019    PTSD (post-traumatic stress disorder)     Following death of child. Past Surgical History:   Procedure Laterality Date    JOINT REPLACEMENT      screws placed in right and left hip at age 15-13yrs old         Review of Systems  Constitutional: negative  Genitourinary:see above  Integument/breast: negative  Behavioral/Psych: negative  Endocrine: negative     All other systems reviewed and are negative. Physical Exam:  /64   Pulse 96   Wt 267 lb (121.1 kg)   LMP 2020 (Approximate)   BMI 39.43 kg/m²   Skin: Warm, dry, no lesions or rashes  Extremities: Without clubbing, cyanosis and edema. Palms and nails are normal. Ambulates without difficulty  Neurological: No gross sensory or motor deficits. Abdomen: Soft, non-tender without masses or organomegaly  bowel sounds normoactive    HOF : 35 cm     FHT : 152 bpm     Assessment:   1. Encounter for supervision of other normal pregnancy in third trimester    2.  Abnormal glucose tolerance test (GTT) during pregnancy, antepartum    3. Encounter for screening for other viral diseases    4. 36 weeks gestation of pregnancy    5. Anemia during pregnancy    6. Slow transit constipation    7. Obesity during pregnancy    8. Supervision of other normal pregnancy        Plan:     NIPT neg results x 2 . QUAD screen wnl . S/P Referral to [facility] - PATIENT Re-assured. Anatomy US wnl. Scheduled for growth scan on 9/18/2020  prilosec for heart burn   Colace for constipation     Orders Placed This Encounter   Procedures    Culture, Strep B Screen, Vaginal/Rectal     Standing Status:   Future     Number of Occurrences:   1     Standing Expiration Date:   9/22/2021    COVID-19 Ambulatory     Standing Status:   Future     Standing Expiration Date:   9/22/2021     Scheduling Instructions:      Saline media preferred given current shortage of viral transport media but both acceptable     Order Specific Question:   Is this test for diagnosis or screening? Answer:   Screening     Order Specific Question:   Symptomatic for COVID-19 as defined by CDC? Answer:   No     Order Specific Question:   Date of Symptom Onset     Answer:   N/A     Order Specific Question:   Hospitalized for COVID-19? Answer:   No     Order Specific Question:   Admitted to ICU for COVID-19? Answer:   No     Order Specific Question:   Employed in healthcare setting? Answer:   Unknown     Order Specific Question:   Resident in a congregate (group) care setting? Answer:   No     Order Specific Question:   Pregnant? Answer:   Yes     Order Specific Question:   Previously tested for COVID-19? Answer:   Unknown        No orders of the defined types were placed in this encounter.     Plan:   Sherif Senior MD    Follow-up in 1 weeks  Early 1 hr OGTT - not indicated  Hep C screen indicated : no  FLU- was given  TDAP- in third trimester       Bean Grijalva M.D., F.A.C.O.G     First trim US : ( 3/30/2020 )   Li   CRL 38.6 mm  ~ 10.6 wk s  +  bpm   Adequate amie   Normal placenta     Darrel Sandifer M.D., FSTEVANO. G

## 2020-09-25 LAB — GROUP B STREP CULTURE: NORMAL

## 2020-09-30 ENCOUNTER — ROUTINE PRENATAL (OUTPATIENT)
Dept: OBGYN CLINIC | Age: 25
End: 2020-09-30

## 2020-09-30 VITALS
DIASTOLIC BLOOD PRESSURE: 74 MMHG | BODY MASS INDEX: 39.58 KG/M2 | SYSTOLIC BLOOD PRESSURE: 112 MMHG | HEART RATE: 88 BPM | WEIGHT: 268 LBS

## 2020-09-30 PROCEDURE — 0502F SUBSEQUENT PRENATAL CARE: CPT | Performed by: OBSTETRICS & GYNECOLOGY

## 2020-09-30 PROCEDURE — G8427 DOCREV CUR MEDS BY ELIG CLIN: HCPCS | Performed by: OBSTETRICS & GYNECOLOGY

## 2020-09-30 PROCEDURE — 1036F TOBACCO NON-USER: CPT | Performed by: OBSTETRICS & GYNECOLOGY

## 2020-09-30 PROCEDURE — G8419 CALC BMI OUT NRM PARAM NOF/U: HCPCS | Performed by: OBSTETRICS & GYNECOLOGY

## 2020-09-30 NOTE — PROGRESS NOTES
OB visit      Xavier Yap is a 22y.o. year old female  @ L  2020, 37.1 wks , OSWALDO 10/20/2020 by 10 wk US not commensurate with LMP . Complaints : nausea with vomiting for  Days  . POB: induced  @ 36 wks , 2019 female . Complicated with severe pre-eclampsiaComplicated  at 2 mnth due possible sepsis . 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 Was placed on Lexapro . PSH: neg     MEDS: Lexapro 5 mg daily - stopped early in pregnancy . Drug Allergies: NKDA    SOCHX: neg x 3     FH: Mother or Father , DM No , HTN No, Other No    /74   Pulse 88   Wt 268 lb (121.6 kg)   LMP 2020 (Approximate)   BMI 39.58 kg/m²   Past Medical History:   Diagnosis Date    Complication of anesthesia     \"doesnt work as well on me\"    Elevated 1hr, Normal 3hr 2020    Hypertension     hx of elevated B/P before pregnancy 5 yrs ago    Pregnancy induced hypertension, antepartum 1/3/2019    PTSD (post-traumatic stress disorder)     Following death of child. Past Surgical History:   Procedure Laterality Date    JOINT REPLACEMENT      screws placed in right and left hip at age 15-13yrs old         Review of Systems  Constitutional: negative  Genitourinary:see above  Integument/breast: negative  Behavioral/Psych: negative  Endocrine: negative     All other systems reviewed and are negative. Physical Exam:  /74   Pulse 88   Wt 268 lb (121.6 kg)   LMP 2020 (Approximate)   BMI 39.58 kg/m²   Skin: Warm, dry, no lesions or rashes  Extremities: Without clubbing, cyanosis and edema. Palms and nails are normal. Ambulates without difficulty  Neurological: No gross sensory or motor deficits. Abdomen: Soft, non-tender without masses or organomegaly  bowel sounds normoactive    HOF : 35 cm     FHT : 152 bpm     Assessment:   1. Encounter for supervision of other normal pregnancy in third trimester    2. 37 weeks gestation of pregnancy    3. Abnormal glucose tolerance test (GTT) during pregnancy, antepartum    4. Anemia during pregnancy    5. Slow transit constipation    6. Obesity during pregnancy        Plan:     NIPT neg results x 2 . QUAD screen wnl . S/P Referral to [facility] - PATIENT Re-assured. Anatomy US wnl. Scheduled for growth scan on 9[facility]18/2020  [facility]   Colace for constipation     No orders of the defined types were placed in this encounter. No orders of the defined types were placed in this encounter. Plan:   Enrique Mcarthur MD    Follow-up in 1 weeks  Early 1 hr OGTT - not indicated  Hep C screen indicated : no  FLU- was given  TDAP- in third trimester       Miles Lew M.D., F.A.C.O.G     First trim US : ( 3/30/2020 )   Li   CRL 38.6 mm  ~ 10.6 wk s  +  bpm   Adequate amie   Normal placenta     Miles Lew M.D., F.A.C.O. G

## 2020-10-06 ENCOUNTER — NURSE ONLY (OUTPATIENT)
Dept: PRIMARY CARE CLINIC | Age: 25
End: 2020-10-06

## 2020-10-06 ENCOUNTER — ROUTINE PRENATAL (OUTPATIENT)
Dept: OBGYN CLINIC | Age: 25
End: 2020-10-06

## 2020-10-06 VITALS
DIASTOLIC BLOOD PRESSURE: 72 MMHG | SYSTOLIC BLOOD PRESSURE: 116 MMHG | HEART RATE: 84 BPM | BODY MASS INDEX: 39.28 KG/M2 | WEIGHT: 266 LBS

## 2020-10-06 PROCEDURE — 0502F SUBSEQUENT PRENATAL CARE: CPT | Performed by: OBSTETRICS & GYNECOLOGY

## 2020-10-06 PROCEDURE — G8419 CALC BMI OUT NRM PARAM NOF/U: HCPCS | Performed by: OBSTETRICS & GYNECOLOGY

## 2020-10-06 PROCEDURE — 1036F TOBACCO NON-USER: CPT | Performed by: OBSTETRICS & GYNECOLOGY

## 2020-10-06 PROCEDURE — G8484 FLU IMMUNIZE NO ADMIN: HCPCS | Performed by: OBSTETRICS & GYNECOLOGY

## 2020-10-06 PROCEDURE — G8427 DOCREV CUR MEDS BY ELIG CLIN: HCPCS | Performed by: OBSTETRICS & GYNECOLOGY

## 2020-10-06 NOTE — PROGRESS NOTES
OB visit      Vijaya Matamoros is a 22y.o. year old female  @ L  2020, 38 wks , OSWALDO 10/20/2020 by 10 wk US not commensurate with LMP . Complaints : nausea with vomiting for  Days  . POB: induced  @ 36 wks , 2019 female . Complicated with severe pre-eclampsiaComplicated  at 2 mnth due possible sepsis . 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 Was placed on Lexapro . PSH: neg     MEDS: Lexapro 5 mg daily - stopped early in pregnancy . Drug Allergies: NKDA    SOCHX: neg x 3     FH: Mother or Father , DM No , HTN No, Other No    /72   Pulse 84   Wt 266 lb (120.7 kg)   LMP 2020 (Approximate)   BMI 39.28 kg/m²   Past Medical History:   Diagnosis Date    Complication of anesthesia     \"doesnt work as well on me\"    Elevated 1hr, Normal 3hr 2020    Hypertension     hx of elevated B/P before pregnancy 5 yrs ago    Pregnancy induced hypertension, antepartum 1/3/2019    PTSD (post-traumatic stress disorder)     Following death of child. Past Surgical History:   Procedure Laterality Date    JOINT REPLACEMENT      screws placed in right and left hip at age 15-13yrs old         Review of Systems  Constitutional: negative  Genitourinary:see above  Integument/breast: negative  Behavioral/Psych: negative  Endocrine: negative     All other systems reviewed and are negative. Physical Exam:  /72   Pulse 84   Wt 266 lb (120.7 kg)   LMP 2020 (Approximate)   BMI 39.28 kg/m²   Skin: Warm, dry, no lesions or rashes  Extremities: Without clubbing, cyanosis and edema. Palms and nails are normal. Ambulates without difficulty  Neurological: No gross sensory or motor deficits. Abdomen: Soft, non-tender without masses or organomegaly  bowel sounds normoactive    HOF : 37 cm     FHT : 152 bpm     Assessment:   1. Encounter for supervision of other normal pregnancy in third trimester    2. 38 weeks gestation of pregnancy    3. Abnormal glucose tolerance test (GTT) during pregnancy, antepartum    4. Anemia during pregnancy    5. Slow transit constipation    6. Obesity during pregnancy        Plan:     NIPT neg results x 2 . QUAD screen wnl . S/P Referral to [facility] - PATIENT Re-assured. Anatomy US wnl. Scheduled for growth scan [facility] 9[facility]18/2020  [facility]   Colace for constipation     No orders of the defined types were placed in this encounter. No orders of the defined types were placed in this encounter. Plan:   Gt Cody MD    Follow-up in 1 weeks  Early 1 hr OGTT - not indicated  Hep C screen indicated : no  FLU- was given  TDAP- in third trimester       Angie You M.D., F.A.C.O.G     First trim US : ( 3/30/2020 )   Li   CRL 38.6 mm  ~ 10.6 wk s  +  bpm   Adequate amie   Normal placenta     Angie You M.D., F.A.C.O. G

## 2020-10-07 DIAGNOSIS — Z11.59 ENCOUNTER FOR SCREENING FOR OTHER VIRAL DISEASES: ICD-10-CM

## 2020-10-07 DIAGNOSIS — Z34.80 SUPERVISION OF OTHER NORMAL PREGNANCY: ICD-10-CM

## 2020-10-08 LAB
SARS-COV-2: NOT DETECTED
SOURCE: NORMAL

## 2020-10-12 ENCOUNTER — ROUTINE PRENATAL (OUTPATIENT)
Dept: OBGYN CLINIC | Age: 25
End: 2020-10-12

## 2020-10-12 VITALS
HEART RATE: 96 BPM | DIASTOLIC BLOOD PRESSURE: 72 MMHG | SYSTOLIC BLOOD PRESSURE: 118 MMHG | WEIGHT: 265 LBS | BODY MASS INDEX: 39.13 KG/M2

## 2020-10-12 PROCEDURE — G8427 DOCREV CUR MEDS BY ELIG CLIN: HCPCS | Performed by: OBSTETRICS & GYNECOLOGY

## 2020-10-12 PROCEDURE — G8484 FLU IMMUNIZE NO ADMIN: HCPCS | Performed by: OBSTETRICS & GYNECOLOGY

## 2020-10-12 PROCEDURE — G8419 CALC BMI OUT NRM PARAM NOF/U: HCPCS | Performed by: OBSTETRICS & GYNECOLOGY

## 2020-10-12 PROCEDURE — 1036F TOBACCO NON-USER: CPT | Performed by: OBSTETRICS & GYNECOLOGY

## 2020-10-12 PROCEDURE — 0502F SUBSEQUENT PRENATAL CARE: CPT | Performed by: OBSTETRICS & GYNECOLOGY

## 2020-10-12 NOTE — PROGRESS NOTES
OB visit      Sheila Dowling is a 22y.o. year old female  @ L  2020, 38.6 wks , OSWALDO 10/20/2020 by 10 wk US not commensurate with LMP . Complaints : nausea with vomiting for  Days  . POB: induced  @ 36 wks , 2019 female . Complicated with severe pre-eclampsiaComplicated  at 2 mnth due possible sepsis . 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 Was placed on Lexapro . PSH: neg     MEDS: Lexapro 5 mg daily - stopped early in pregnancy . Drug Allergies: NKDA    SOCHX: neg x 3     FH: Mother or Father , DM No , HTN No, Other No    /72   Pulse 96   Wt 265 lb (120.2 kg)   LMP 2020 (Approximate)   BMI 39.13 kg/m²   Past Medical History:   Diagnosis Date    Complication of anesthesia     \"doesnt work as well on me\"    Elevated 1hr, Normal 3hr 2020    Hypertension     hx of elevated B/P before pregnancy 5 yrs ago    Pregnancy induced hypertension, antepartum 1/3/2019    PTSD (post-traumatic stress disorder)     Following death of child. Past Surgical History:   Procedure Laterality Date    JOINT REPLACEMENT      screws placed in right and left hip at age 15-13yrs old         Review of Systems  Constitutional: negative  Genitourinary:see above  Integument/breast: negative  Behavioral/Psych: negative  Endocrine: negative     All other systems reviewed and are negative. Physical Exam:  /72   Pulse 96   Wt 265 lb (120.2 kg)   LMP 2020 (Approximate)   BMI 39.13 kg/m²   Skin: Warm, dry, no lesions or rashes  Extremities: Without clubbing, cyanosis and edema. Palms and nails are normal. Ambulates without difficulty  Neurological: No gross sensory or motor deficits. Abdomen: Soft, non-tender without masses or organomegaly  bowel sounds normoactive    HOF : 37 cm     FHT : 152 bpm     Assessment:   1. 38 weeks gestation of pregnancy    2. Abnormal glucose tolerance test (GTT) during pregnancy, antepartum    3.  Slow transit constipation    4. Obesity during pregnancy    5. Anemia during pregnancy        Plan:     NIPT neg results x 2 . QUAD screen wnl . S/P Referral to [facility] - PATIENT Re-assured. Anatomy US wnl. Scheduled [facility] growth scan [facility]18/2020  [facility]   Colace for constipation     No orders of the defined types were placed in this encounter. No orders of the defined types were placed in this encounter. Plan:   Cody Hernandez MD    Scheduled for IOL in AM 10/13/2020  Follow-up in 1 weeks  Early 1 hr OGTT - not indicated  Hep C screen indicated : no  FLU- was given  TDAP- in third trimester       Jose Shoemaker M.D., F.A.C.O.G     First trim US : ( 3/30/2020 )   Li   CRL 38.6 mm  ~ 10.6 wk s  +  bpm   Adequate amie   Normal placenta     Jose Shoemaker M.D., F.A.C.O. G

## 2020-10-13 ENCOUNTER — HOSPITAL ENCOUNTER (INPATIENT)
Age: 25
LOS: 2 days | Discharge: HOME | End: 2020-10-15
Attending: OBSTETRICS & GYNECOLOGY | Admitting: OBSTETRICS & GYNECOLOGY
Payer: COMMERCIAL

## 2020-10-13 ENCOUNTER — ANESTHESIA EVENT (OUTPATIENT)
Dept: LABOR AND DELIVERY | Age: 25
End: 2020-10-13
Payer: COMMERCIAL

## 2020-10-13 ENCOUNTER — ANESTHESIA (OUTPATIENT)
Dept: LABOR AND DELIVERY | Age: 25
End: 2020-10-13
Payer: COMMERCIAL

## 2020-10-13 ENCOUNTER — APPOINTMENT (OUTPATIENT)
Dept: LABOR AND DELIVERY | Age: 25
End: 2020-10-13
Payer: COMMERCIAL

## 2020-10-13 LAB
ABO/RH: NORMAL
ALBUMIN SERPL-MCNC: 3.7 G/DL (ref 3.5–4.6)
ALP BLD-CCNC: 106 U/L (ref 40–130)
ALT SERPL-CCNC: 7 U/L (ref 0–33)
AMPHETAMINE SCREEN, URINE: NEGATIVE
ANION GAP SERPL CALCULATED.3IONS-SCNC: 11 MEQ/L (ref 9–15)
ANTIBODY SCREEN: NORMAL
AST SERPL-CCNC: 10 U/L (ref 0–35)
BACTERIA: NEGATIVE /HPF
BARBITURATE SCREEN URINE: NEGATIVE
BASOPHILS ABSOLUTE: 0 K/UL (ref 0–0.2)
BASOPHILS RELATIVE PERCENT: 0.2 %
BENZODIAZEPINE SCREEN, URINE: NEGATIVE
BILIRUB SERPL-MCNC: 0.3 MG/DL (ref 0.2–0.7)
BILIRUBIN URINE: NEGATIVE
BLOOD, URINE: NEGATIVE
BUN BLDV-MCNC: 7 MG/DL (ref 6–20)
CALCIUM SERPL-MCNC: 8.8 MG/DL (ref 8.5–9.9)
CANNABINOID SCREEN URINE: NEGATIVE
CHLORIDE BLD-SCNC: 98 MEQ/L (ref 95–107)
CLARITY: CLEAR
CO2: 21 MEQ/L (ref 20–31)
COCAINE METABOLITE SCREEN URINE: NEGATIVE
COLOR: YELLOW
CREAT SERPL-MCNC: 0.38 MG/DL (ref 0.5–0.9)
EOSINOPHILS ABSOLUTE: 0 K/UL (ref 0–0.7)
EOSINOPHILS RELATIVE PERCENT: 0.4 %
EPITHELIAL CELLS, UA: ABNORMAL /HPF (ref 0–5)
GFR AFRICAN AMERICAN: >60
GFR NON-AFRICAN AMERICAN: >60
GLOBULIN: 2.6 G/DL (ref 2.3–3.5)
GLUCOSE BLD-MCNC: 100 MG/DL (ref 70–99)
GLUCOSE URINE: NEGATIVE MG/DL
HCT VFR BLD CALC: 35.3 % (ref 37–47)
HEMOGLOBIN: 12.1 G/DL (ref 12–16)
HEPATITIS B SURFACE ANTIGEN INTERPRETATION: NORMAL
HYALINE CASTS: ABNORMAL /HPF (ref 0–5)
KETONES, URINE: NEGATIVE MG/DL
LEUKOCYTE ESTERASE, URINE: ABNORMAL
LYMPHOCYTES ABSOLUTE: 1.5 K/UL (ref 1–4.8)
LYMPHOCYTES RELATIVE PERCENT: 13.6 %
Lab: NORMAL
MCH RBC QN AUTO: 31.3 PG (ref 27–31.3)
MCHC RBC AUTO-ENTMCNC: 34.4 % (ref 33–37)
MCV RBC AUTO: 90.9 FL (ref 82–100)
METHADONE SCREEN, URINE: NEGATIVE
MONOCYTES ABSOLUTE: 0.6 K/UL (ref 0.2–0.8)
MONOCYTES RELATIVE PERCENT: 5.4 %
NEUTROPHILS ABSOLUTE: 8.7 K/UL (ref 1.4–6.5)
NEUTROPHILS RELATIVE PERCENT: 80.4 %
NITRITE, URINE: NEGATIVE
OPIATE SCREEN URINE: NEGATIVE
OXYCODONE URINE: NEGATIVE
PDW BLD-RTO: 13.1 % (ref 11.5–14.5)
PH UA: 6.5 (ref 5–9)
PHENCYCLIDINE SCREEN URINE: NEGATIVE
PLATELET # BLD: 272 K/UL (ref 130–400)
POTASSIUM SERPL-SCNC: 3.4 MEQ/L (ref 3.4–4.9)
PROPOXYPHENE SCREEN: NEGATIVE
PROTEIN UA: NEGATIVE MG/DL
RAPID HIV 1&2: NEGATIVE
RBC # BLD: 3.88 M/UL (ref 4.2–5.4)
RBC UA: ABNORMAL /HPF (ref 0–5)
RPR: NORMAL
RUBELLA ANTIBODY IGG: 99.5 IU/ML
SODIUM BLD-SCNC: 130 MEQ/L (ref 135–144)
SPECIFIC GRAVITY UA: 1.02 (ref 1–1.03)
TOTAL PROTEIN: 6.3 G/DL (ref 6.3–8)
UROBILINOGEN, URINE: 0.2 E.U./DL
WBC # BLD: 10.8 K/UL (ref 4.8–10.8)
WBC UA: ABNORMAL /HPF (ref 0–5)

## 2020-10-13 PROCEDURE — 3E033VJ INTRODUCTION OF OTHER HORMONE INTO PERIPHERAL VEIN, PERCUTANEOUS APPROACH: ICD-10-PCS | Performed by: OBSTETRICS & GYNECOLOGY

## 2020-10-13 PROCEDURE — 87340 HEPATITIS B SURFACE AG IA: CPT

## 2020-10-13 PROCEDURE — 80053 COMPREHEN METABOLIC PANEL: CPT

## 2020-10-13 PROCEDURE — 81001 URINALYSIS AUTO W/SCOPE: CPT

## 2020-10-13 PROCEDURE — 86850 RBC ANTIBODY SCREEN: CPT

## 2020-10-13 PROCEDURE — 10907ZC DRAINAGE OF AMNIOTIC FLUID, THERAPEUTIC FROM PRODUCTS OF CONCEPTION, VIA NATURAL OR ARTIFICIAL OPENING: ICD-10-PCS | Performed by: OBSTETRICS & GYNECOLOGY

## 2020-10-13 PROCEDURE — 6360000002 HC RX W HCPCS: Performed by: OBSTETRICS & GYNECOLOGY

## 2020-10-13 PROCEDURE — 86762 RUBELLA ANTIBODY: CPT

## 2020-10-13 PROCEDURE — 86901 BLOOD TYPING SEROLOGIC RH(D): CPT

## 2020-10-13 PROCEDURE — 59400 OBSTETRICAL CARE: CPT | Performed by: OBSTETRICS & GYNECOLOGY

## 2020-10-13 PROCEDURE — 86592 SYPHILIS TEST NON-TREP QUAL: CPT

## 2020-10-13 PROCEDURE — 86703 HIV-1/HIV-2 1 RESULT ANTBDY: CPT

## 2020-10-13 PROCEDURE — 1220000000 HC SEMI PRIVATE OB R&B

## 2020-10-13 PROCEDURE — 85025 COMPLETE CBC W/AUTO DIFF WBC: CPT

## 2020-10-13 PROCEDURE — 80307 DRUG TEST PRSMV CHEM ANLYZR: CPT

## 2020-10-13 PROCEDURE — 6360000002 HC RX W HCPCS

## 2020-10-13 PROCEDURE — 2580000003 HC RX 258: Performed by: OBSTETRICS & GYNECOLOGY

## 2020-10-13 PROCEDURE — 86900 BLOOD TYPING SEROLOGIC ABO: CPT

## 2020-10-13 PROCEDURE — 3700000025 EPIDURAL BLOCK: Performed by: NURSE ANESTHETIST, CERTIFIED REGISTERED

## 2020-10-13 PROCEDURE — 2500000003 HC RX 250 WO HCPCS: Performed by: OBSTETRICS & GYNECOLOGY

## 2020-10-13 PROCEDURE — 6370000000 HC RX 637 (ALT 250 FOR IP): Performed by: OBSTETRICS & GYNECOLOGY

## 2020-10-13 RX ORDER — MAGNESIUM HYDROXIDE/ALUMINUM HYDROXICE/SIMETHICONE 120; 1200; 1200 MG/30ML; MG/30ML; MG/30ML
30 SUSPENSION ORAL EVERY 6 HOURS PRN
Status: DISCONTINUED | OUTPATIENT
Start: 2020-10-13 | End: 2020-10-16 | Stop reason: HOSPADM

## 2020-10-13 RX ORDER — ONDANSETRON 2 MG/ML
4 INJECTION INTRAMUSCULAR; INTRAVENOUS EVERY 6 HOURS PRN
Status: DISCONTINUED | OUTPATIENT
Start: 2020-10-13 | End: 2020-10-13

## 2020-10-13 RX ORDER — MODIFIED LANOLIN
OINTMENT (GRAM) TOPICAL PRN
Status: DISCONTINUED | OUTPATIENT
Start: 2020-10-13 | End: 2020-10-16 | Stop reason: HOSPADM

## 2020-10-13 RX ORDER — ACETAMINOPHEN 325 MG/1
650 TABLET ORAL EVERY 4 HOURS PRN
Status: DISCONTINUED | OUTPATIENT
Start: 2020-10-13 | End: 2020-10-13

## 2020-10-13 RX ORDER — SODIUM CHLORIDE 0.9 % (FLUSH) 0.9 %
10 SYRINGE (ML) INJECTION PRN
Status: DISCONTINUED | OUTPATIENT
Start: 2020-10-13 | End: 2020-10-13

## 2020-10-13 RX ORDER — DOCUSATE SODIUM 100 MG/1
100 CAPSULE, LIQUID FILLED ORAL 2 TIMES DAILY
Status: DISCONTINUED | OUTPATIENT
Start: 2020-10-13 | End: 2020-10-13

## 2020-10-13 RX ORDER — SODIUM CHLORIDE 0.9 % (FLUSH) 0.9 %
10 SYRINGE (ML) INJECTION EVERY 12 HOURS SCHEDULED
Status: DISCONTINUED | OUTPATIENT
Start: 2020-10-13 | End: 2020-10-13

## 2020-10-13 RX ORDER — FERROUS SULFATE 325(65) MG
325 TABLET ORAL 2 TIMES DAILY WITH MEALS
Status: DISCONTINUED | OUTPATIENT
Start: 2020-10-13 | End: 2020-10-16 | Stop reason: HOSPADM

## 2020-10-13 RX ORDER — ACETAMINOPHEN 325 MG/1
650 TABLET ORAL EVERY 4 HOURS PRN
Status: DISCONTINUED | OUTPATIENT
Start: 2020-10-13 | End: 2020-10-16 | Stop reason: HOSPADM

## 2020-10-13 RX ORDER — IBUPROFEN 800 MG/1
800 TABLET ORAL EVERY 8 HOURS PRN
Status: DISCONTINUED | OUTPATIENT
Start: 2020-10-13 | End: 2020-10-16 | Stop reason: HOSPADM

## 2020-10-13 RX ORDER — SODIUM CHLORIDE 0.9 % (FLUSH) 0.9 %
10 SYRINGE (ML) INJECTION PRN
Status: DISCONTINUED | OUTPATIENT
Start: 2020-10-13 | End: 2020-10-16 | Stop reason: HOSPADM

## 2020-10-13 RX ORDER — SODIUM CHLORIDE 0.9 % (FLUSH) 0.9 %
10 SYRINGE (ML) INJECTION EVERY 12 HOURS SCHEDULED
Status: DISCONTINUED | OUTPATIENT
Start: 2020-10-13 | End: 2020-10-16 | Stop reason: HOSPADM

## 2020-10-13 RX ORDER — SODIUM CHLORIDE, SODIUM LACTATE, POTASSIUM CHLORIDE, CALCIUM CHLORIDE 600; 310; 30; 20 MG/100ML; MG/100ML; MG/100ML; MG/100ML
INJECTION, SOLUTION INTRAVENOUS CONTINUOUS
Status: DISCONTINUED | OUTPATIENT
Start: 2020-10-13 | End: 2020-10-13

## 2020-10-13 RX ORDER — DOCUSATE SODIUM 100 MG/1
100 CAPSULE, LIQUID FILLED ORAL 2 TIMES DAILY
Status: DISCONTINUED | OUTPATIENT
Start: 2020-10-13 | End: 2020-10-16 | Stop reason: HOSPADM

## 2020-10-13 RX ORDER — IBUPROFEN 600 MG/1
600 TABLET ORAL EVERY 6 HOURS PRN
Status: DISCONTINUED | OUTPATIENT
Start: 2020-10-13 | End: 2020-10-13

## 2020-10-13 RX ORDER — NALOXONE HYDROCHLORIDE 0.4 MG/ML
0.4 INJECTION, SOLUTION INTRAMUSCULAR; INTRAVENOUS; SUBCUTANEOUS PRN
Status: DISCONTINUED | OUTPATIENT
Start: 2020-10-13 | End: 2020-10-13

## 2020-10-13 RX ORDER — ONDANSETRON 4 MG/1
8 TABLET, ORALLY DISINTEGRATING ORAL EVERY 8 HOURS PRN
Status: DISCONTINUED | OUTPATIENT
Start: 2020-10-13 | End: 2020-10-16 | Stop reason: HOSPADM

## 2020-10-13 RX ORDER — FAMOTIDINE 20 MG/1
20 TABLET, FILM COATED ORAL 2 TIMES DAILY
Status: DISCONTINUED | OUTPATIENT
Start: 2020-10-13 | End: 2020-10-16 | Stop reason: HOSPADM

## 2020-10-13 RX ORDER — METHYLERGONOVINE MALEATE 0.2 MG/ML
INJECTION INTRAVENOUS
Status: COMPLETED
Start: 2020-10-13 | End: 2020-10-13

## 2020-10-13 RX ORDER — SODIUM CHLORIDE, SODIUM LACTATE, POTASSIUM CHLORIDE, CALCIUM CHLORIDE 600; 310; 30; 20 MG/100ML; MG/100ML; MG/100ML; MG/100ML
INJECTION, SOLUTION INTRAVENOUS CONTINUOUS
Status: DISCONTINUED | OUTPATIENT
Start: 2020-10-13 | End: 2020-10-16 | Stop reason: HOSPADM

## 2020-10-13 RX ORDER — METHYLERGONOVINE MALEATE 0.2 MG/ML
200 INJECTION INTRAVENOUS
Status: ACTIVE | OUTPATIENT
Start: 2020-10-13 | End: 2020-10-13

## 2020-10-13 RX ADMIN — SODIUM CHLORIDE, POTASSIUM CHLORIDE, SODIUM LACTATE AND CALCIUM CHLORIDE: 600; 310; 30; 20 INJECTION, SOLUTION INTRAVENOUS at 11:12

## 2020-10-13 RX ADMIN — METHYLERGONOVINE MALEATE 200 MCG: 0.2 INJECTION INTRAVENOUS at 17:35

## 2020-10-13 RX ADMIN — SODIUM CHLORIDE, POTASSIUM CHLORIDE, SODIUM LACTATE AND CALCIUM CHLORIDE: 600; 310; 30; 20 INJECTION, SOLUTION INTRAVENOUS at 19:20

## 2020-10-13 RX ADMIN — Medication 10 ML: at 22:51

## 2020-10-13 RX ADMIN — ONDANSETRON 8 MG: 4 TABLET, ORALLY DISINTEGRATING ORAL at 20:19

## 2020-10-13 RX ADMIN — BENZOCAINE AND LEVOMENTHOL: 200; 5 SPRAY TOPICAL at 20:18

## 2020-10-13 RX ADMIN — ACETAMINOPHEN 650 MG: 325 TABLET, FILM COATED ORAL at 21:54

## 2020-10-13 RX ADMIN — Medication 12 ML/HR: at 11:30

## 2020-10-13 RX ADMIN — SODIUM CHLORIDE, POTASSIUM CHLORIDE, SODIUM LACTATE AND CALCIUM CHLORIDE: 600; 310; 30; 20 INJECTION, SOLUTION INTRAVENOUS at 16:19

## 2020-10-13 RX ADMIN — Medication: at 20:18

## 2020-10-13 RX ADMIN — Medication 1 MILLI-UNITS/MIN: at 16:10

## 2020-10-13 RX ADMIN — Medication 1 MILLI-UNITS/MIN: at 13:35

## 2020-10-13 RX ADMIN — DOCUSATE SODIUM 100 MG: 100 CAPSULE ORAL at 20:18

## 2020-10-13 RX ADMIN — IBUPROFEN 800 MG: 800 TABLET ORAL at 20:18

## 2020-10-13 RX ADMIN — FAMOTIDINE 20 MG: 20 TABLET ORAL at 20:18

## 2020-10-13 RX ADMIN — Medication 334 ML/HR: at 17:26

## 2020-10-13 RX ADMIN — Medication 95 MILLI-UNITS/MIN: at 16:10

## 2020-10-13 RX ADMIN — FERROUS SULFATE TAB 325 MG (65 MG ELEMENTAL FE) 325 MG: 325 (65 FE) TAB at 20:53

## 2020-10-13 RX ADMIN — SODIUM CHLORIDE, POTASSIUM CHLORIDE, SODIUM LACTATE AND CALCIUM CHLORIDE: 600; 310; 30; 20 INJECTION, SOLUTION INTRAVENOUS at 10:09

## 2020-10-13 ASSESSMENT — PAIN SCALES - GENERAL: PAINLEVEL_OUTOF10: 3

## 2020-10-13 NOTE — ANESTHESIA PROCEDURE NOTES
Epidural Block    Patient location during procedure: OB  Start time: 10/13/2020 11:21 AM  End time: 10/13/2020 11:23 AM  Reason for block: labor epidural  Staffing  Resident/CRNA: JENNIFER Smalls CRNA  Performed: resident/CRNA   Preanesthetic Checklist  Completed: patient identified, site marked, surgical consent, pre-op evaluation, timeout performed, IV checked, risks and benefits discussed, monitors and equipment checked, anesthesia consent given, oxygen available and patient being monitored  Epidural  Patient position: sitting  Prep: ChloraPrep and x3  Patient monitoring: continuous pulse ox and frequent blood pressure checks  Approach: midline  Location: lumbar (1-5)  Injection technique: ARON saline  Provider prep: mask and sterile gloves  Needle  Needle type: Tuohy   Needle gauge: 17 G  Needle length: 3.5 in  Needle insertion depth: 9 cm  Catheter type: side hole  Catheter size: 18 G  Catheter at skin depth: 15 cm  Test dose: negative (lidocaine with epi test dose 3ml)  Kit:  robledo perifix  Lot number: [id number]  Expiration date: 6/1/2021  Assessment  Sensory level: T10  Hemodynamics: stable  Attempts: 1

## 2020-10-13 NOTE — ANESTHESIA PRE PROCEDURE
 oxytocin (PITOCIN) 30 units in 500 mL infusion  334 mL/hr Intravenous Once PRBRITTANY Nguyen MD        And    oxytocin (PITOCIN) 30 units in 500 mL infusion  95 nataliia-units/min Intravenous Once PRN Kavya Nguyen MD        oxytocin (PITOCIN) 30 units in 500 mL infusion  1 nataliia-units/min Intravenous Continuous Miranda Alvarez MD           Allergies:  No Known Allergies    Problem List:    Patient Active Problem List   Diagnosis Code    Obesity (BMI 30-39. 9) E66.9    Hx of chlamydia infection Z86.19    Heart burn R12    Pregnancy induced hypertension, antepartum O13.9    Gestational hypertension, third trimester O13.3    Severe pre-eclampsia in third trimester O14.13    Pre-eclampsia in postpartum period O14.95    Elevated 1hr, Normal 3hr O99.810       Past Medical History:        Diagnosis Date    Complication of anesthesia     \"doesnt work as well on me\"    Elevated 1hr, Normal 3hr 8/5/2020    Hypertension     hx of elevated B/P before pregnancy 5 yrs ago    Pregnancy induced hypertension, antepartum 1/3/2019    PTSD (post-traumatic stress disorder)     Following death of child. Past Surgical History:        Procedure Laterality Date    JOINT REPLACEMENT      screws placed in right and left hip at age 15-13yrs old       Social History:    Social History     Tobacco Use    Smoking status: Never Smoker    Smokeless tobacco: Never Used   Substance Use Topics    Alcohol use:  No                                Counseling given: Not Answered      Vital Signs (Current):   Vitals:    10/13/20 0835 10/13/20 0836 10/13/20 0844 10/13/20 0851   BP: 137/83      Pulse: 82  82 87   Resp:    18   Temp:    36.7 °C (98.1 °F)   TempSrc:       SpO2:  98%     Weight:       Height:                                                  BP Readings from Last 3 Encounters:   10/13/20 137/83   10/12/20 118/72   10/06/20 116/72       NPO Status: Time of last liquid consumption: 0700                        Time of last solid consumption: 2300                        Date of last liquid consumption: 10/13/20                        Date of last solid food consumption: 10/12/20    BMI:   Wt Readings from Last 3 Encounters:   10/13/20 262 lb 12.8 oz (119.2 kg)   10/12/20 265 lb (120.2 kg)   10/06/20 266 lb (120.7 kg)     Body mass index is 38.81 kg/m². CBC:   Lab Results   Component Value Date    WBC 10.8 10/13/2020    RBC 3.88 10/13/2020    HGB 12.1 10/13/2020    HCT 35.3 10/13/2020    MCV 90.9 10/13/2020    RDW 13.1 10/13/2020     10/13/2020       CMP:   Lab Results   Component Value Date     01/08/2019    K 3.5 01/08/2019     01/08/2019    CO2 24 01/08/2019    BUN 8 01/08/2019    CREATININE 0.53 01/08/2019    GFRAA >60.0 01/08/2019    LABGLOM >60.0 01/08/2019    GLUCOSE 90 01/08/2019    PROT 5.9 01/08/2019    CALCIUM 8.5 01/08/2019    BILITOT <0.2 01/08/2019    ALKPHOS 100 01/08/2019    AST 35 01/08/2019    ALT 22 01/08/2019       POC Tests: No results for input(s): POCGLU, POCNA, POCK, POCCL, POCBUN, POCHEMO, POCHCT in the last 72 hours.     Coags: No results found for: PROTIME, INR, APTT    HCG (If Applicable):   Lab Results   Component Value Date    PREGTESTUR neg 02/13/2019        ABGs: No results found for: PHART, PO2ART, GHV8UOE, VIW8FQN, BEART, V9ZJAAOS     Type & Screen (If Applicable):  No results found for: LABABO, LABRH    Drug/Infectious Status (If Applicable):  No results found for: HIV, HEPCAB    COVID-19 Screening (If Applicable):   Lab Results   Component Value Date    COVID19 Not Detected 10/07/2020         Anesthesia Evaluation    Airway: Mallampati: III        Dental: normal exam         Pulmonary:Negative Pulmonary ROS breath sounds clear to auscultation                             Cardiovascular:    (+) hypertension:,         Rhythm: regular                      Neuro/Psych:   (+) psychiatric history: stable with treatment            GI/Hepatic/Renal:   (+) morbid obesity

## 2020-10-13 NOTE — PROGRESS NOTES
1118 patient sitting on the side of the bed for epidural.  1122 timeout done. 1124 - cath placed. 1124 test dose done. 1130 patient assisted to a left tilt position.

## 2020-10-13 NOTE — PROGRESS NOTES
Dr Tiera Guerra notified of patient blood pressures of 151/70 at 1834, 154/85 at 1850 and 150/80 at 1904. Does not desire labs at this time, no new orders.

## 2020-10-13 NOTE — FLOWSHEET NOTE
Nurse calls Dr. Omer Grady regarding no orders for pain medication. Dr. Omer Grady would like Motrin and Tylenol ordered prn. Dr. Omer Grady would like the In House to manage the patient regarding blood pressures and most other incidents. If there is a continued issue with bleeding. Nurse to call Dr. Omer Grady.

## 2020-10-13 NOTE — PLAN OF CARE
Problem: Anxiety:  Goal: Level of anxiety will decrease  Description: Level of anxiety will decrease  10/13/2020 0906 by Lj Wilhelm RN  Outcome: Ongoing  10/13/2020 0905 by Lj Wilhelm RN  Outcome: Ongoing  10/13/2020 0904 by Lj Wilhelm RN  Outcome: Ongoing     Problem: Breathing Pattern - Ineffective:  Goal: Able to breathe comfortably  Description: Able to breathe comfortably  10/13/2020 0906 by Lj Wilhelm RN  Outcome: Ongoing  10/13/2020 0905 by Lj Wilhelm RN  Outcome: Ongoing  10/13/2020 0904 by Lj Wilhelm RN  Outcome: Ongoing     Problem: Fluid Volume - Imbalance:  Goal: Absence of imbalanced fluid volume signs and symptoms  Description: Absence of imbalanced fluid volume signs and symptoms  10/13/2020 0906 by Lj Wilhelm RN  Outcome: Ongoing  10/13/2020 0905 by Lj Wilhelm RN  Outcome: Ongoing  10/13/2020 0904 by Lj Wilhelm RN  Outcome: Ongoing  Goal: Absence of intrapartum hemorrhage signs and symptoms  Description: Absence of intrapartum hemorrhage signs and symptoms  10/13/2020 0906 by Lj Wilhelm RN  Outcome: Ongoing  10/13/2020 0905 by Lj Wilhelm RN  Outcome: Ongoing  10/13/2020 0904 by Lj Wilhelm RN  Outcome: Ongoing     Problem: Infection - Intrapartum Infection:  Goal: Will show no infection signs and symptoms  Description: Will show no infection signs and symptoms  10/13/2020 0906 by Lj Wilhelm RN  Outcome: Ongoing  10/13/2020 0905 by Lj Wilhelm RN  Outcome: Ongoing  10/13/2020 0904 by Lj Wilhelm RN  Outcome: Ongoing     Problem: Labor Process - Prolonged:  Goal: Labor progression, first stage, within specified pattern  Description: Labor progression, first stage, within specified pattern  10/13/2020 0906 by Lj Wilhelm RN  Outcome: Ongoing  10/13/2020 0905 by Lj Wilhelm RN  Outcome: Ongoing  10/13/2020 0904 by Lj Wilhelm RN  Outcome: Ongoing  Goal: Labor progession, second stage, within specified Lamberto Brody RN  Outcome: Ongoing  10/13/2020 0905 by Kelli Acosta RN  Outcome: Ongoing  10/13/2020 0904 by Kelli Acosta RN  Outcome: Ongoing  Goal: Urinary elimination within specified parameters  Description: Urinary elimination within specified parameters  10/13/2020 0906 by Kelli Acosta RN  Outcome: Ongoing  10/13/2020 0905 by Kelli Acosta RN  Outcome: Ongoing  10/13/2020 0904 by Kelli Acosta RN  Outcome: Ongoing

## 2020-10-13 NOTE — PROGRESS NOTES
Dr Maki Gross notified of patient blood pressures at 1749 of 141/70 and at 1804 of 142/71. No new orders at this time.

## 2020-10-13 NOTE — PROGRESS NOTES
Dr Tiera Guerra at bedside, vaginal exam performed, patient 7-8 cm. Pitocin turned off per Dr Alvarez's order. Plans to AROM patient when he returns from surgery.

## 2020-10-13 NOTE — PROGRESS NOTES
Dr Gutierrez Unger in room, AROM, moderate amount of clear fluid with bloody show, patient 8 cm dilated. Pitocin turned back on per order from Dr Gutierrez Unger.

## 2020-10-13 NOTE — PROGRESS NOTES
Verbal order from Dr Zain Danielle for IM Methyrgine. Confirmed with him past medical history of preeclampsia with first delivery, no elevated blood pressures over 140/90 during this labor. Okay to proceed with medication administration.

## 2020-10-13 NOTE — ANESTHESIA POSTPROCEDURE EVALUATION
Department of Anesthesiology  Postprocedure Note    Patient: Pravin Denis  MRN: [id number]  YOB: 1995  Date of evaluation: 10/13/2020  Time:  7:10 PM     Procedure Summary     Date:  10/13/20 Room / Location:      Anesthesia Start:  1115 Anesthesia Stop:  1722    Procedure:  Labor Analgesia Diagnosis:      Scheduled Providers:   Responsible Provider:  Ivana Cowden, APRN - CRNA    Anesthesia Type:  epidural ASA Status:  3          Anesthesia Type: epidural    Chacho Phase I: Chacho Score: 10    Chacho Phase II:      Last vitals: Reviewed and per EMR flowsheets.        Anesthesia Post Evaluation    Patient location during evaluation: bedside  Patient participation: complete - patient participated  Level of consciousness: awake and alert  Pain score: 0  Airway patency: patent  Nausea & Vomiting: no nausea  Complications: no  Cardiovascular status: hemodynamically stable  Respiratory status: acceptable  Hydration status: stable

## 2020-10-14 PROBLEM — Z78.9 ADMITTED TO LABOR AND DELIVERY: Status: ACTIVE | Noted: 2020-10-14

## 2020-10-14 LAB
BASOPHILS ABSOLUTE: 0 K/UL (ref 0–0.2)
BASOPHILS RELATIVE PERCENT: 0.3 %
EOSINOPHILS ABSOLUTE: 0.1 K/UL (ref 0–0.7)
EOSINOPHILS RELATIVE PERCENT: 0.7 %
HCT VFR BLD CALC: 33.5 % (ref 37–47)
HEMOGLOBIN: 11.1 G/DL (ref 12–16)
LYMPHOCYTES ABSOLUTE: 1.9 K/UL (ref 1–4.8)
LYMPHOCYTES RELATIVE PERCENT: 18.7 %
MCH RBC QN AUTO: 30.8 PG (ref 27–31.3)
MCHC RBC AUTO-ENTMCNC: 33.1 % (ref 33–37)
MCV RBC AUTO: 92.9 FL (ref 82–100)
MONOCYTES ABSOLUTE: 0.6 K/UL (ref 0.2–0.8)
MONOCYTES RELATIVE PERCENT: 6.3 %
NEUTROPHILS ABSOLUTE: 7.4 K/UL (ref 1.4–6.5)
NEUTROPHILS RELATIVE PERCENT: 74 %
PDW BLD-RTO: 13.1 % (ref 11.5–14.5)
PLATELET # BLD: 237 K/UL (ref 130–400)
RBC # BLD: 3.6 M/UL (ref 4.2–5.4)
WBC # BLD: 10 K/UL (ref 4.8–10.8)

## 2020-10-14 PROCEDURE — 6370000000 HC RX 637 (ALT 250 FOR IP): Performed by: OBSTETRICS & GYNECOLOGY

## 2020-10-14 PROCEDURE — 85025 COMPLETE CBC W/AUTO DIFF WBC: CPT

## 2020-10-14 PROCEDURE — 36415 COLL VENOUS BLD VENIPUNCTURE: CPT

## 2020-10-14 PROCEDURE — 1220000000 HC SEMI PRIVATE OB R&B

## 2020-10-14 RX ADMIN — IBUPROFEN 800 MG: 800 TABLET ORAL at 15:42

## 2020-10-14 RX ADMIN — IBUPROFEN 800 MG: 800 TABLET ORAL at 07:04

## 2020-10-14 RX ADMIN — IBUPROFEN 800 MG: 800 TABLET ORAL at 23:57

## 2020-10-14 RX ADMIN — FAMOTIDINE 20 MG: 20 TABLET ORAL at 19:41

## 2020-10-14 RX ADMIN — ACETAMINOPHEN 650 MG: 325 TABLET, FILM COATED ORAL at 02:50

## 2020-10-14 RX ADMIN — DOCUSATE SODIUM 100 MG: 100 CAPSULE ORAL at 19:41

## 2020-10-14 RX ADMIN — FAMOTIDINE 20 MG: 20 TABLET ORAL at 09:15

## 2020-10-14 RX ADMIN — DOCUSATE SODIUM 100 MG: 100 CAPSULE ORAL at 09:15

## 2020-10-14 ASSESSMENT — PAIN DESCRIPTION - DESCRIPTORS: DESCRIPTORS: CRAMPING

## 2020-10-14 ASSESSMENT — PAIN SCALES - GENERAL
PAINLEVEL_OUTOF10: 2
PAINLEVEL_OUTOF10: 4
PAINLEVEL_OUTOF10: 0
PAINLEVEL_OUTOF10: 1
PAINLEVEL_OUTOF10: 2

## 2020-10-14 ASSESSMENT — PAIN DESCRIPTION - PAIN TYPE: TYPE: ACUTE PAIN

## 2020-10-14 ASSESSMENT — PAIN DESCRIPTION - FREQUENCY: FREQUENCY: INTERMITTENT

## 2020-10-14 ASSESSMENT — PAIN DESCRIPTION - LOCATION: LOCATION: ABDOMEN

## 2020-10-14 NOTE — FLOWSHEET NOTE
Dr. Kali Joseph notified of pt BP per HTN policy. BP WNL at this time. No further orders. Will continue to monitor.  Electronically signed by Elisha Ceron RN on 10/14/2020 at 7:59 PM

## 2020-10-14 NOTE — PLAN OF CARE
Problem: Pain:  Description: Pain management should include both nonpharmacologic and pharmacologic interventions.   Goal: Control of acute pain  Description: Control of acute pain  Outcome: Ongoing  Goal: Control of chronic pain  Description: Control of chronic pain  Outcome: Ongoing     Problem: Discharge Planning:  Goal: Discharged to appropriate level of care  Description: Discharged to appropriate level of care  Outcome: Ongoing     Problem: Constipation:  Goal: Bowel elimination is within specified parameters  Description: Bowel elimination is within specified parameters  Outcome: Ongoing     Problem: Mood - Altered:  Goal: Mood stable  Description: Mood stable  Outcome: Ongoing

## 2020-10-14 NOTE — LACTATION NOTE
This note was copied from a baby's chart. Lactation in to see family for initial visit. - Mother reports infant has been sleepy at breast, infant unwrapped, and placed skin-to-skin to assist with waking.  - Mother assisted to attempt cross-cradle, infant dozing, then placed football hold and infant latching on and off. - Mother shown techniques to deepen latch, use of hand expression and breast compression to assist with waking/continuing latch/suck. - Mother denies discomfort with current latch.   - Mother reports having a pump at home for future use as needed, that she obtained from insurance. Please see Lactation Navigator Note for additional comments. - LC to continue to follow.

## 2020-10-14 NOTE — FLOWSHEET NOTE
Nurse calls housekeeping again for an occupied clean. Housekeeping explains they had to do a stat clean and will be there soon.

## 2020-10-15 VITALS
DIASTOLIC BLOOD PRESSURE: 86 MMHG | BODY MASS INDEX: 38.92 KG/M2 | WEIGHT: 262.8 LBS | OXYGEN SATURATION: 97 % | HEART RATE: 74 BPM | RESPIRATION RATE: 16 BRPM | SYSTOLIC BLOOD PRESSURE: 149 MMHG | HEIGHT: 69 IN | TEMPERATURE: 98 F

## 2020-10-15 PROCEDURE — G0008 ADMIN INFLUENZA VIRUS VAC: HCPCS | Performed by: OBSTETRICS & GYNECOLOGY

## 2020-10-15 PROCEDURE — 90686 IIV4 VACC NO PRSV 0.5 ML IM: CPT | Performed by: OBSTETRICS & GYNECOLOGY

## 2020-10-15 PROCEDURE — 6370000000 HC RX 637 (ALT 250 FOR IP): Performed by: OBSTETRICS & GYNECOLOGY

## 2020-10-15 PROCEDURE — 7200000001 HC VAGINAL DELIVERY

## 2020-10-15 PROCEDURE — 6360000002 HC RX W HCPCS: Performed by: OBSTETRICS & GYNECOLOGY

## 2020-10-15 PROCEDURE — 90715 TDAP VACCINE 7 YRS/> IM: CPT | Performed by: OBSTETRICS & GYNECOLOGY

## 2020-10-15 PROCEDURE — S9443 LACTATION CLASS: HCPCS

## 2020-10-15 PROCEDURE — 90471 IMMUNIZATION ADMIN: CPT | Performed by: OBSTETRICS & GYNECOLOGY

## 2020-10-15 RX ORDER — ACETAMINOPHEN 500 MG
1000 TABLET ORAL EVERY 6 HOURS PRN
Qty: 60 TABLET | Refills: 1 | Status: SHIPPED | OUTPATIENT
Start: 2020-10-15

## 2020-10-15 RX ORDER — IBUPROFEN 800 MG/1
800 TABLET ORAL EVERY 6 HOURS PRN
Qty: 120 TABLET | Refills: 3 | Status: ON HOLD | OUTPATIENT
Start: 2020-10-15 | End: 2022-05-09 | Stop reason: HOSPADM

## 2020-10-15 RX ADMIN — IBUPROFEN 800 MG: 800 TABLET ORAL at 11:29

## 2020-10-15 RX ADMIN — TETANUS TOXOID, REDUCED DIPHTHERIA TOXOID AND ACELLULAR PERTUSSIS VACCINE, ADSORBED 0.5 ML: 5; 2.5; 8; 8; 2.5 SUSPENSION INTRAMUSCULAR at 22:47

## 2020-10-15 RX ADMIN — INFLUENZA A VIRUS A/VICTORIA/2454/2019 IVR-207 (H1N1) ANTIGEN (PROPIOLACTONE INACTIVATED), INFLUENZA A VIRUS A/HONG KONG/2671/2019 IVR-208 (H3N2) ANTIGEN (PROPIOLACTONE INACTIVATED), INFLUENZA B VIRUS B/VICTORIA/705/2018 BVR-11 ANTIGEN (PROPIOLACTONE INACTIVATED), INFLUENZA B VIRUS B/PHUKET/3073/2013 BVR-1B ANTIGEN (PROPIOLACTONE INACTIVATED) 0.5 ML: 15; 15; 15; 15 INJECTION, SUSPENSION INTRAMUSCULAR at 22:48

## 2020-10-15 RX ADMIN — DOCUSATE SODIUM 100 MG: 100 CAPSULE ORAL at 09:43

## 2020-10-15 RX ADMIN — DOCUSATE SODIUM 100 MG: 100 CAPSULE ORAL at 22:47

## 2020-10-15 RX ADMIN — IBUPROFEN 800 MG: 800 TABLET ORAL at 22:47

## 2020-10-15 RX ADMIN — FAMOTIDINE 20 MG: 20 TABLET ORAL at 09:43

## 2020-10-15 ASSESSMENT — PAIN SCALES - GENERAL
PAINLEVEL_OUTOF10: 1
PAINLEVEL_OUTOF10: 2
PAINLEVEL_OUTOF10: 0

## 2020-10-15 NOTE — PROGRESS NOTES
PROGRESS NOTE     POSTPARTUM DAY 2  Pt is doing well. Pt is ambulating and tolerating diet.    plans to breastfeed  Lochia wnl  well and happy    /80   Pulse 62   Temp 98.8 °F (37.1 °C) (Oral)   Resp 18   Ht 5' 9\" (1.753 m)   Wt 262 lb 12.8 oz (119.2 kg)   LMP 01/21/2020 (Approximate)   SpO2 97%   Breastfeeding Unknown   BMI 38.81 kg/m²   GENERAL APPEARANCE: alert, well appearing, in no apparent distress  UTERUS POSTPARTUM: normal size, well involuted, firm, non-tender    Labs:       Results for orders placed or performed during the hospital encounter of 10/13/20   Comprehensive metabolic panel   Result Value Ref Range    Sodium 130 (L) 135 - 144 mEq/L    Potassium 3.4 3.4 - 4.9 mEq/L    Chloride 98 95 - 107 mEq/L    CO2 21 20 - 31 mEq/L    Anion Gap 11 9 - 15 mEq/L    Glucose 100 (H) 70 - 99 mg/dL    BUN 7 6 - 20 mg/dL    CREATININE 0.38 (L) 0.50 - 0.90 mg/dL    GFR Non-African American >60.0 >60    GFR  >60.0 >60    Calcium 8.8 8.5 - 9.9 mg/dL    Total Protein 6.3 6.3 - 8.0 g/dL    Alb 3.7 3.5 - 4.6 g/dL    Total Bilirubin 0.3 0.2 - 0.7 mg/dL    Alkaline Phosphatase 106 40 - 130 U/L    ALT 7 0 - 33 U/L    AST 10 0 - 35 U/L    Globulin 2.6 2.3 - 3.5 g/dL   CBC auto differential   Result Value Ref Range    WBC 10.8 4.8 - 10.8 K/uL    RBC 3.88 (L) 4.20 - 5.40 M/uL    Hemoglobin 12.1 12.0 - 16.0 g/dL    Hematocrit 35.3 (L) 37.0 - 47.0 %    MCV 90.9 82.0 - 100.0 fL    MCH 31.3 27.0 - 31.3 pg    MCHC 34.4 33.0 - 37.0 %    RDW 13.1 11.5 - 14.5 %    Platelets 143 280 - 362 K/uL    Neutrophils % 80.4 %    Lymphocytes % 13.6 %    Monocytes % 5.4 %    Eosinophils % 0.4 %    Basophils % 0.2 %    Neutrophils Absolute 8.7 (H) 1.4 - 6.5 K/uL    Lymphocytes Absolute 1.5 1.0 - 4.8 K/uL    Monocytes Absolute 0.6 0.2 - 0.8 K/uL    Eosinophils Absolute 0.0 0.0 - 0.7 K/uL    Basophils Absolute 0.0 0.0 - 0.2 K/uL   Urine Drug Screen   Result Value Ref Range    Amphetamine Screen, Urine negative Negative <1000 ng/mL    Barbiturate Screen, Ur negative Negative < 200 ng/mL    Benzodiazepine Screen, Urine negative Negative < 200 ng/mL    Cannabinoid Scrn, Ur negative Negative < 50 ng/mL    Cocaine Metabolite Screen, Urine negative Negative < 300 ng/mL    Opiate Scrn, Ur negative Negative < 300 ng/mL    PCP Screen, Urine negative Negative < 25 ng/mL    Methadone Screen, Urine negative Negative <300 ng/mL    Propoxyphene Scrn, Ur negative Negative <300 ng/mL    Oxycodone Urine negative Negative <100 ng/mL    Drug Screen Comment: see below    RPR Reflex to Titer and TPPA   Result Value Ref Range    RPR Non-reactive Non-reactive   Hepatitis B Surface Antigen   Result Value Ref Range    Hep B S Ag Interp Non-reactive    Urinalysis   Result Value Ref Range    Color, UA Yellow Straw/Yellow    Clarity, UA Clear Clear    Glucose, Ur Negative Negative mg/dL    Bilirubin Urine Negative Negative    Ketones, Urine Negative Negative mg/dL    Specific Gravity, UA 1.020 1.005 - 1.030    Blood, Urine Negative Negative    pH, UA 6.5 5.0 - 9.0    Protein, UA Negative Negative mg/dL    Urobilinogen, Urine 0.2 <2.0 E.U./dL    Nitrite, Urine Negative Negative    Leukocyte Esterase, Urine TRACE (A) Negative   HIV Rapid 1&2   Result Value Ref Range    Rapid HIV 1&2 Negative Negative   Rubella antibody, IgG   Result Value Ref Range    Rubella Antibody IgG 99.5 IU/mL   Microscopic Urinalysis   Result Value Ref Range    Bacteria, UA Negative Negative /HPF    Hyaline Casts, UA 0-1 0 - 5 /HPF    WBC, UA 3-5 0 - 5 /HPF    RBC, UA 3-5 (A) 0 - 5 /HPF    Epithelial Cells, UA 3-5 0 - 5 /HPF   CBC auto differential   Result Value Ref Range    WBC 10.0 4.8 - 10.8 K/uL    RBC 3.60 (L) 4.20 - 5.40 M/uL    Hemoglobin 11.1 (L) 12.0 - 16.0 g/dL    Hematocrit 33.5 (L) 37.0 - 47.0 %    MCV 92.9 82.0 - 100.0 fL    MCH 30.8 27.0 - 31.3 pg    MCHC 33.1 33.0 - 37.0 %    RDW 13.1 11.5 - 14.5 %    Platelets 995 513 - 991 K/uL    Neutrophils % 74.0 % Lymphocytes % 18.7 %    Monocytes % 6.3 %    Eosinophils % 0.7 %    Basophils % 0.3 %    Neutrophils Absolute 7.4 (H) 1.4 - 6.5 K/uL    Lymphocytes Absolute 1.9 1.0 - 4.8 K/uL    Monocytes Absolute 0.6 0.2 - 0.8 K/uL    Eosinophils Absolute 0.1 0.0 - 0.7 K/uL    Basophils Absolute 0.0 0.0 - 0.2 K/uL   TYPE AND SCREEN   Result Value Ref Range    ABO/Rh O POS     Antibody Screen NEG            22 y.o. Orestes Cooney now P s/p vaginal delivery doing well PPD#1  1. Routine postpartum care  normal postpartum exam  See orders and Patient Instructions  D/C home today     Elizabeth Pringle M.D., F.JUAN.C.O. G

## 2020-10-15 NOTE — PLAN OF CARE
Problem: Pain:  Description: Pain management should include both nonpharmacologic and pharmacologic interventions.   Goal: Control of acute pain  Outcome: Ongoing  Goal: Control of chronic pain  Outcome: Ongoing     Problem: Discharge Planning:  Goal: Discharged to appropriate level of care  10/14/2020 2118 by Onesimo Steve RN  Outcome: Ongoing  10/14/2020 1326 by Radha Rodriguez RN  Outcome: Ongoing     Problem: Constipation:  Goal: Bowel elimination is within specified parameters  10/14/2020 2118 by Onesimo Steve RN  Outcome: Ongoing  10/14/2020 1326 by Radha Rodriguez RN  Outcome: Ongoing     Problem: Mood - Altered:  Goal: Mood stable  10/14/2020 2118 by Onesimo Steve RN  Outcome: Ongoing  10/14/2020 1326 by Radha Rodriguez RN  Outcome: Ongoing

## 2020-10-15 NOTE — FLOWSHEET NOTE
Assessment completed. Denies headache, blurred vision, stated last PM she noted dizziness while lying in bed and holding her infant. Stated it went away, encouraged to inform this nurse when it recurs.

## 2020-10-15 NOTE — FLOWSHEET NOTE
Infant sleepy and has only had attempts last couple of feedings. Set mother up with breast pump. Instructed on use and cleaning. All questions answered. Instructed mother to pump if infant does not have an active feeding.  Electronically signed by Santos Rice RN on 10/15/2020 at 6:27 AM

## 2020-10-15 NOTE — PLAN OF CARE
Problem: Pain:  Goal: Control of acute pain  Description: Control of acute pain  Outcome: Ongoing  Goal: Control of chronic pain  Description: Control of chronic pain  Outcome: Ongoing     Problem: Discharge Planning:  Goal: Discharged to appropriate level of care  Description: Discharged to appropriate level of care  Outcome: Ongoing     Problem: Constipation:  Goal: Bowel elimination is within specified parameters  Description: Bowel elimination is within specified parameters  Outcome: Ongoing     Problem: Mood - Altered:  Goal: Mood stable  Description: Mood stable  Outcome: Ongoing

## 2020-10-15 NOTE — LACTATION NOTE
Mother asking for assistance finger feeding infant  Mother states infant holding donor milk in her mouth and not swallowing  Infant holds tongue to the roof of her mouth  Infant stimulated awake  Demonstrated how to finger feed infant   Infant actively sucking on mothers  Finger  Reviewed breastfeeding concepts with pt  Mother states she has a breast pump at home    Encouraged mother to breast feed infant every 2-3 hours for 10-20 minutes per breast   Encouraged mother to pump her breast if infant does not latch  Informed mother about [email or web address] and our breast feeding warmline    Mitesh CLAROS IBCLC

## 2020-10-15 NOTE — DISCHARGE SUMMARY
Discharge Summary     POSTPARTUM DAY 2  Pt is doing well. Pt is ambulating and tolerating diet.    plans to breastfeed  Lochia wnl  well and happy    /80   Pulse 62   Temp 98.8 °F (37.1 °C) (Oral)   Resp 18   Ht 5' 9\" (1.753 m)   Wt 262 lb 12.8 oz (119.2 kg)   LMP 01/21/2020 (Approximate)   SpO2 97%   Breastfeeding Unknown   BMI 38.81 kg/m²   GENERAL APPEARANCE: alert, well appearing, in no apparent distress  UTERUS POSTPARTUM: normal size, well involuted, firm, non-tender    Labs:       Results for orders placed or performed during the hospital encounter of 10/13/20   Comprehensive metabolic panel   Result Value Ref Range    Sodium 130 (L) 135 - 144 mEq/L    Potassium 3.4 3.4 - 4.9 mEq/L    Chloride 98 95 - 107 mEq/L    CO2 21 20 - 31 mEq/L    Anion Gap 11 9 - 15 mEq/L    Glucose 100 (H) 70 - 99 mg/dL    BUN 7 6 - 20 mg/dL    CREATININE 0.38 (L) 0.50 - 0.90 mg/dL    GFR Non-African American >60.0 >60    GFR  >60.0 >60    Calcium 8.8 8.5 - 9.9 mg/dL    Total Protein 6.3 6.3 - 8.0 g/dL    Alb 3.7 3.5 - 4.6 g/dL    Total Bilirubin 0.3 0.2 - 0.7 mg/dL    Alkaline Phosphatase 106 40 - 130 U/L    ALT 7 0 - 33 U/L    AST 10 0 - 35 U/L    Globulin 2.6 2.3 - 3.5 g/dL   CBC auto differential   Result Value Ref Range    WBC 10.8 4.8 - 10.8 K/uL    RBC 3.88 (L) 4.20 - 5.40 M/uL    Hemoglobin 12.1 12.0 - 16.0 g/dL    Hematocrit 35.3 (L) 37.0 - 47.0 %    MCV 90.9 82.0 - 100.0 fL    MCH 31.3 27.0 - 31.3 pg    MCHC 34.4 33.0 - 37.0 %    RDW 13.1 11.5 - 14.5 %    Platelets 833 033 - 756 K/uL    Neutrophils % 80.4 %    Lymphocytes % 13.6 %    Monocytes % 5.4 %    Eosinophils % 0.4 %    Basophils % 0.2 %    Neutrophils Absolute 8.7 (H) 1.4 - 6.5 K/uL    Lymphocytes Absolute 1.5 1.0 - 4.8 K/uL    Monocytes Absolute 0.6 0.2 - 0.8 K/uL    Eosinophils Absolute 0.0 0.0 - 0.7 K/uL    Basophils Absolute 0.0 0.0 - 0.2 K/uL   Urine Drug Screen   Result Value Ref Range    Amphetamine Screen, Urine negative Negative <1000 ng/mL    Barbiturate Screen, Ur negative Negative < 200 ng/mL    Benzodiazepine Screen, Urine negative Negative < 200 ng/mL    Cannabinoid Scrn, Ur negative Negative < 50 ng/mL    Cocaine Metabolite Screen, Urine negative Negative < 300 ng/mL    Opiate Scrn, Ur negative Negative < 300 ng/mL    PCP Screen, Urine negative Negative < 25 ng/mL    Methadone Screen, Urine negative Negative <300 ng/mL    Propoxyphene Scrn, Ur negative Negative <300 ng/mL    Oxycodone Urine negative Negative <100 ng/mL    Drug Screen Comment: see below    RPR Reflex to Titer and TPPA   Result Value Ref Range    RPR Non-reactive Non-reactive   Hepatitis B Surface Antigen   Result Value Ref Range    Hep B S Ag Interp Non-reactive    Urinalysis   Result Value Ref Range    Color, UA Yellow Straw/Yellow    Clarity, UA Clear Clear    Glucose, Ur Negative Negative mg/dL    Bilirubin Urine Negative Negative    Ketones, Urine Negative Negative mg/dL    Specific Gravity, UA 1.020 1.005 - 1.030    Blood, Urine Negative Negative    pH, UA 6.5 5.0 - 9.0    Protein, UA Negative Negative mg/dL    Urobilinogen, Urine 0.2 <2.0 E.U./dL    Nitrite, Urine Negative Negative    Leukocyte Esterase, Urine TRACE (A) Negative   HIV Rapid 1&2   Result Value Ref Range    Rapid HIV 1&2 Negative Negative   Rubella antibody, IgG   Result Value Ref Range    Rubella Antibody IgG 99.5 IU/mL   Microscopic Urinalysis   Result Value Ref Range    Bacteria, UA Negative Negative /HPF    Hyaline Casts, UA 0-1 0 - 5 /HPF    WBC, UA 3-5 0 - 5 /HPF    RBC, UA 3-5 (A) 0 - 5 /HPF    Epithelial Cells, UA 3-5 0 - 5 /HPF   CBC auto differential   Result Value Ref Range    WBC 10.0 4.8 - 10.8 K/uL    RBC 3.60 (L) 4.20 - 5.40 M/uL    Hemoglobin 11.1 (L) 12.0 - 16.0 g/dL    Hematocrit 33.5 (L) 37.0 - 47.0 %    MCV 92.9 82.0 - 100.0 fL    MCH 30.8 27.0 - 31.3 pg    MCHC 33.1 33.0 - 37.0 %    RDW 13.1 11.5 - 14.5 %    Platelets 644 121 - 817 K/uL    Neutrophils % 74.0 % Lymphocytes % 18.7 %    Monocytes % 6.3 %    Eosinophils % 0.7 %    Basophils % 0.3 %    Neutrophils Absolute 7.4 (H) 1.4 - 6.5 K/uL    Lymphocytes Absolute 1.9 1.0 - 4.8 K/uL    Monocytes Absolute 0.6 0.2 - 0.8 K/uL    Eosinophils Absolute 0.1 0.0 - 0.7 K/uL    Basophils Absolute 0.0 0.0 - 0.2 K/uL   TYPE AND SCREEN   Result Value Ref Range    ABO/Rh O POS     Antibody Screen NEG            22 y.o. Suad Sers now P s/p vaginal delivery doing well PPD#1  1. Routine postpartum care  normal postpartum exam  See orders and Patient Instructions  D/C home today        Medication List      START taking these medications    acetaminophen 500 MG tablet  Commonly known as:  APAP Extra Strength  Take 2 tablets by mouth every 6 hours as needed for Pain     ibuprofen 800 MG tablet  Commonly known as:  ADVIL;MOTRIN  Take 1 tablet by mouth every 6 hours as needed for Pain        CONTINUE taking these medications    docusate sodium 100 MG capsule  Commonly known as:  Colace  Take 1 capsule by mouth 2 times daily as needed for Constipation     ferrous sulfate 325 (65 Fe) MG tablet  Commonly known as:  IRON 325  Take 1 tablet by mouth 2 times daily (with meals)     omeprazole 20 MG delayed release capsule  Commonly known as:  PriLOSEC  Take 1 capsule by mouth 2 times daily     PRENATAL COMPLETE PO     simethicone 80 MG chewable tablet  Commonly known as:  MYLICON  Take 1 tablet by mouth 4 times daily as needed for Flatulence        STOP taking these medications    aspirin EC 81 MG EC tablet     metoclopramide 10 MG tablet  Commonly known as:  REGLAN           Where to Get Your Medications      These medications were sent to [facility], [address] [place] [address] RACHELLE DE SANTIAGO - P [phone] - F [phone] [address] [address]    Hours:  24-hours Phone:  [phone]   · acetaminophen 500 MG tablet  · ibuprofen 800 MG tablet         Glenda Phoenix M.D., FSTEVANOKelvin G

## 2020-10-16 NOTE — PLAN OF CARE
Problem: Pain:  Goal: Control of acute pain  Description: Control of acute pain  10/15/2020 2109 by Man Sierra RN  Outcome: Completed  10/15/2020 2109 by Man Sierra RN  Outcome: Met This Shift  10/15/2020 1426 by Maxwell Barbosa RN  Outcome: Ongoing  Goal: Control of chronic pain  Description: Control of chronic pain  10/15/2020 2109 by Man Sierra RN  Outcome: Completed  10/15/2020 2109 by Man Sierra RN  Outcome: Met This Shift  10/15/2020 1426 by Maxwell Barbosa RN  Outcome: Ongoing     Problem: Discharge Planning:  Goal: Discharged to appropriate level of care  Description: Discharged to appropriate level of care  10/15/2020 2109 by Man Sierra RN  Outcome: Completed  10/15/2020 2109 by Mna Sierra RN  Outcome: Met This Shift  10/15/2020 1426 by Maxwell Barbosa RN  Outcome: Ongoing     Problem: Constipation:  Goal: Bowel elimination is within specified parameters  Description: Bowel elimination is within specified parameters  10/15/2020 2109 by Man Sierra RN  Outcome: Completed  10/15/2020 2109 by Man Sierra RN  Outcome: Met This Shift  10/15/2020 1426 by Maxwell Barbosa RN  Outcome: Ongoing     Problem: Mood - Altered:  Goal: Mood stable  Description: Mood stable  10/15/2020 2109 by Man Sierra RN  Outcome: Completed  10/15/2020 2109 by Man Sierra RN  Outcome: Met This Shift  10/15/2020 1426 by Maxwell Barbosa RN  Outcome: Ongoing

## 2020-10-16 NOTE — PLAN OF CARE
Problem: Pain:  Goal: Control of acute pain  Description: Control of acute pain  10/15/2020 2109 by Maico Rojo RN  Outcome: Met This Shift  10/15/2020 1426 by Janie Coppola RN  Outcome: Ongoing  Goal: Control of chronic pain  Description: Control of chronic pain  10/15/2020 2109 by Maico Rojo RN  Outcome: Met This Shift  10/15/2020 1426 by Janie Coppola RN  Outcome: Ongoing     Problem: Discharge Planning:  Goal: Discharged to appropriate level of care  Description: Discharged to appropriate level of care  10/15/2020 2109 by Maico Rojo RN  Outcome: Met This Shift  10/15/2020 1426 by Janie Coppola RN  Outcome: Ongoing     Problem: Constipation:  Goal: Bowel elimination is within specified parameters  Description: Bowel elimination is within specified parameters  10/15/2020 2109 by Maico Rojo RN  Outcome: Met This Shift  10/15/2020 1426 by Janie Coppola RN  Outcome: Ongoing     Problem: Mood - Altered:  Goal: Mood stable  Description: Mood stable  10/15/2020 2109 by aMico Rojo RN  Outcome: Met This Shift  10/15/2020 1426 by Janie Coppola RN  Outcome: Ongoing

## 2020-11-18 ENCOUNTER — NURSE ONLY (OUTPATIENT)
Dept: OBGYN CLINIC | Age: 25
End: 2020-11-18
Payer: COMMERCIAL

## 2020-11-18 ENCOUNTER — OFFICE VISIT (OUTPATIENT)
Dept: OBGYN CLINIC | Age: 25
End: 2020-11-18

## 2020-11-18 VITALS
WEIGHT: 246 LBS | BODY MASS INDEX: 36.43 KG/M2 | SYSTOLIC BLOOD PRESSURE: 122 MMHG | HEART RATE: 92 BPM | DIASTOLIC BLOOD PRESSURE: 74 MMHG | HEIGHT: 69 IN

## 2020-11-18 LAB
HCG, URINE, POC: NEGATIVE
Lab: NORMAL
NEGATIVE QC PASS/FAIL: NORMAL
POSITIVE QC PASS/FAIL: NORMAL

## 2020-11-18 PROCEDURE — 0503F POSTPARTUM CARE VISIT: CPT | Performed by: OBSTETRICS & GYNECOLOGY

## 2020-11-18 PROCEDURE — 96372 THER/PROPH/DIAG INJ SC/IM: CPT | Performed by: ADVANCED PRACTICE MIDWIFE

## 2020-11-18 PROCEDURE — 81025 URINE PREGNANCY TEST: CPT | Performed by: ADVANCED PRACTICE MIDWIFE

## 2020-11-18 RX ORDER — MEDROXYPROGESTERONE ACETATE 150 MG/ML
150 INJECTION, SUSPENSION INTRAMUSCULAR ONCE
Status: COMPLETED | OUTPATIENT
Start: 2020-11-18 | End: 2020-11-18

## 2020-11-18 RX ORDER — ESCITALOPRAM OXALATE 5 MG/1
5 TABLET ORAL DAILY
Qty: 30 TABLET | Refills: 1 | Status: SHIPPED | OUTPATIENT
Start: 2020-11-18

## 2020-11-18 RX ORDER — MEDROXYPROGESTERONE ACETATE 150 MG/ML
150 INJECTION, SUSPENSION INTRAMUSCULAR
Qty: 1 ML | Refills: 3 | Status: ON HOLD | OUTPATIENT
Start: 2020-11-18 | End: 2022-05-09 | Stop reason: HOSPADM

## 2020-11-18 RX ADMIN — MEDROXYPROGESTERONE ACETATE 150 MG: 150 INJECTION, SUSPENSION INTRAMUSCULAR at 15:25

## 2020-11-18 NOTE — PROGRESS NOTES
OB visit      Filiberto Tarango is a 22y.o. year old female  @ L  2020, 38.6 wks , OSWALDO 10/20/2020 by 10 wk US not commensurate with LMP . Complaints : nausea with vomiting for  Days  . POB: induced  @ 36 wks , 2019 female . Complicated with severe pre-eclampsiaComplicated  at 2 mnth due possible sepsis . 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 Was placed on Lexapro . PSH: neg     MEDS: Lexapro 5 mg daily - stopped early in pregnancy . Drug Allergies: NKDA    SOCHX: neg x 3     FH: Mother or Father , DM No , HTN No, Other No    /74 (Site: Right Upper Arm, Position: Sitting, Cuff Size: Large Adult)   Pulse 92   Ht 5' 9\" (1.753 m)   Wt 246 lb (111.6 kg)   LMP 2020 (Approximate)   BMI 36.33 kg/m²   Past Medical History:   Diagnosis Date    Complication of anesthesia     \"doesnt work as well on me\"    Elevated 1hr, Normal 3hr 2020    Hypertension     hx of elevated B/P before pregnancy 5 yrs ago    Pregnancy induced hypertension, antepartum 1/3/2019    PTSD (post-traumatic stress disorder)     Following death of child. Past Surgical History:   Procedure Laterality Date    JOINT REPLACEMENT      screws placed in right and left hip at age 15-13yrs old         Review of Systems  Constitutional: negative  Genitourinary:see above  Integument/breast: negative  Behavioral/Psych: negative  Endocrine: negative     All other systems reviewed and are negative. Physical Exam:  /74 (Site: Right Upper Arm, Position: Sitting, Cuff Size: Large Adult)   Pulse 92   Ht 5' 9\" (1.753 m)   Wt 246 lb (111.6 kg)   LMP 2020 (Approximate)   BMI 36.33 kg/m²   Skin: Warm, dry, no lesions or rashes  Extremities: Without clubbing, cyanosis and edema. Palms and nails are normal. Ambulates without difficulty  Neurological: No gross sensory or motor deficits.   Abdomen: Soft, non-tender without masses or organomegaly  bowel sounds normoactive    HOF : 37 cm     FHT : 152 bpm     Assessment:   No diagnosis found. Plan:     NIPT neg results x 2 . QUAD screen wnl . S/P Referral to [facility] - PATIENT Re-assured. Anatomy US wnl. Scheduled for growth scan on 9/18/2020  prilose for heart burn   Colace for constipation     No orders of the defined types were placed in this encounter. No orders of the defined types were placed in this encounter. Plan:   Keesha Garcia MD    Scheduled for IOL in AM 10/13/2020  Follow-up in 1 weeks  Early 1 hr OGTT - not indicated  Hep C screen indicated : no  FLU- was given  TDAP- in third trimester       Amanda Bardales M.D., F.A.C.O.G     First trim US : ( 3/30/2020 )   Li   CRL 38.6 mm  ~ 10.6 wk s  +  bpm   Adequate amie   Normal placenta     Amanda Bardales M.D., F.A.C.O. G

## 2020-11-18 NOTE — PROGRESS NOTES
PostPartum     Edy Johnson is a 22 y.o. female     The patient was seen. She has achief complaints today. She delivered  on repeat . She is  breast feeding and there is not any signs or symptoms of mastitis. The patient completed the E.P.D.S. Evaluation form and scored 15. She does have any signs or symptoms of post partum depression. She denies any suicidal thoughts with a plan, intent to harm others and delusional ideas. Today her lochia islight she denies any dizziness or shortness of breath. Her pregnancy was complicated by previous  section. She does admit to having good home support. Vitals:/74 (Site: Right Upper Arm, Position: Sitting, Cuff Size: Large Adult)   Pulse 92   Ht 5' 9\" (1.753 m)   Wt 246 lb (111.6 kg)   LMP 2020 (Approximate)   BMI 36.33 kg/m²   Allergies:  Patient has no known allergies. Past Medical History:   Diagnosis Date    Complication of anesthesia     \"doesnt work as well on me\"    Elevated 1hr, Normal 3hr 2020    Hypertension     hx of elevated B/P before pregnancy 5 yrs ago    Pregnancy induced hypertension, antepartum 1/3/2019    PTSD (post-traumatic stress disorder)     Following death of child. Past Surgical History:   Procedure Laterality Date    JOINT REPLACEMENT      screws placed in right and left hip at age 15-13yrs old     OB History        2    Para   2    Term   1       1    AB        Living   1       SAB        TAB        Ectopic        Molar        Multiple   0    Live Births   2              No family history on file.   Social History     Socioeconomic History    Marital status: Single     Spouse name: Not on file    Number of children: Not on file    Years of education: Not on file    Highest education level: Not on file   Occupational History    Not on file   Social Needs    Financial resource strain: Not on file    Food insecurity     Worry: Not on file     Inability: Not on file  Transportation needs     Medical: Not on file     Non-medical: Not on file   Tobacco Use    Smoking status: Never Smoker    Smokeless tobacco: Never Used   Substance and Sexual Activity    Alcohol use: No    Drug use: No    Sexual activity: Yes     Partners: Male   Lifestyle    Physical activity     Days per week: Not on file     Minutes per session: Not on file    Stress: Not on file   Relationships    Social connections     Talks on phone: Not on file     Gets together: Not on file     Attends [employer] service: Not on file     Active member of club or organization: Not on file     Attends meetings of clubs or organizations: Not on file     Relationship status: Not on file    Intimate partner violence     Fear of current or ex partner: Not on file     Emotionally abused: Not on file     Physically abused: Not on file     Forced sexual activity: Not on file   Other Topics Concern    Not on file   Social History Narrative    Not on file       Contraceptive method:  none    Review of Systems  Review of Systems  Review of Systems   Constitutional:Negative for chills and fever. Respiratory: Negative for cough and shortness of breath. Cardiovascular: Negative for chest pain and palpitations. Gastrointestinal: Negative for nausea and vomiting. Genitourinary: Negative for dysuria, frequency and urgency. Musculoskeletal: Negative for myalgias. Neurological: Negative for dizziness, seizures and headaches. Psychiatric/Behavioral: Negative for depression and suicidal ideas. Physical Exam  Physical Exam  Physical Exam   Constitutional: She is oriented to person, place, and time and well-developed, well-nourished, and in no distress. HENT:   Head: Normocephalic and atraumatic. Eyes: EOM are normal. Pupils are equal, round, and reactive to light. Neck: Normal range of motion. Neck supple. Cardiovascular: Normal rate and regular rhythm.     Pulmonary/Chest: Effort normal and breath sounds normal.   Abdominal: Soft. Bowel sounds are normal.   Genitourinary:   Genitourinary Comments: deferred   Neurological: She is alertand oriented to person, place, and time. Psychiatric: Mood, memory, affect and judgment normal.       Assessment:      Diagnosis Orders   1. Postpartum care and examination     2. Glucose intolerance  Glucose Tolerance, 2 Hours   3. Postpartum depression         Chief Complaint   Patient presents with    Postpartum Care     Delivered 10/13/20       EPDS Score of 15    Pap :normal    Glucose tolerance: Normal      Body mass index is 36.33 kg/m². Obesity:  Class II  Smoking:  No    Plan:       Obesity Counseling:  Given  Smoking Counseling:  N/A       Orders Placed This Encounter   Procedures    Glucose Tolerance, 2 Hours     Standing Status:   Future     Standing Expiration Date:   11/18/2021       Orders Placed This Encounter   Medications    escitalopram (LEXAPRO) 5 MG tablet     Sig: Take 1 tablet by mouth daily     Dispense:  30 tablet     Refill:  1       1. Return to the office: No follow-ups on file. 2. Signs & Symptoms of mastitis reviewed; notify if occurs  3. Secondary smoke risks reviewed. Increased risks of respiratory problems, Sudden     infant death syndrome, and potential malignancies. 4. Family planning counseling and STD counseling completed       Jodie Bowden M.D., F.A.C.O. G

## 2021-12-01 ENCOUNTER — HOSPITAL ENCOUNTER (OUTPATIENT)
Dept: DIABETES SERVICES | Age: 26
Setting detail: THERAPIES SERIES
Discharge: HOME | End: 2021-12-01
Payer: COMMERCIAL

## 2021-12-01 PROCEDURE — 97802 MEDICAL NUTRITION INDIV IN: CPT

## 2021-12-02 NOTE — PROGRESS NOTES
kg)   08/03/20 258 lb (117 kg)   07/06/20 258 lb (117 kg)   06/01/20 254 lb (115.2 kg)   05/08/20 251 lb (113.9 kg)   04/13/20 253 lb (114.8 kg)   03/30/20 251 lb (113.9 kg)   01/15/19 221 lb (100.2 kg)   01/03/19 242 lb (109.8 kg)   12/20/18 242 lb (109.8 kg)   12/07/18 242 lb (109.8 kg)   11/16/18 238 lb (108 kg)       Assessment and Plan:  I instructed the pt on GDM guidelines. We discussed carb counting, foods that contained carbs, and how to incorporate the correct portions in each meals. I reviewed the importance of not skipping meals. Emphasis was placed on having a set meal schedule and getting regular physical activity. I also educated the patient on the dangers of listeria and the importance of food safety. Pt was receptive to education and asked appropriate questions. Readiness to change is high. Nutritional Requirements:  Estimated Energy Needs:  Weight Used: 111kg   Method Used: 16-18kcal/kg  Estimated kcal Needs: 1800-2000kcal per day  Protein Needs:  Weight used: 66 kg  1g/kg = 66 g per day      Nutrition Diagnosis and Goal  Problem: Food and nutrition related knowledge deficit  Etiology/related to: no prior nutrition education   Symptoms/Signs/as evidenced by: 24 hour recall      Goal:   1) eat 3-4 servings of carbs per meal, 1-2 for snacks  2) Drink at least 64 oz of water daily  3) No skipping meals   4) portion control    Education Needs: Provided GDMguidelines, food label instructions, 1800 5 day menu, DM snacks      NUTRITION RECOMMENDATIONS / MONITORING / EVALUATION  1. Name and Office phone number given for reference.     Catherine Gardner, MS, RD, LD

## 2022-05-09 ENCOUNTER — HOSPITAL ENCOUNTER (OUTPATIENT)
Age: 27
Discharge: HOME | End: 2022-05-09
Attending: OBSTETRICS & GYNECOLOGY | Admitting: OBSTETRICS & GYNECOLOGY
Payer: COMMERCIAL

## 2022-05-09 VITALS
RESPIRATION RATE: 18 BRPM | DIASTOLIC BLOOD PRESSURE: 73 MMHG | SYSTOLIC BLOOD PRESSURE: 137 MMHG | HEART RATE: 76 BPM | OXYGEN SATURATION: 98 % | WEIGHT: 248 LBS | BODY MASS INDEX: 36.73 KG/M2 | HEIGHT: 69 IN | TEMPERATURE: 98.2 F

## 2022-05-09 LAB
ALBUMIN SERPL-MCNC: 3.7 G/DL (ref 3.5–4.6)
ALP BLD-CCNC: 98 U/L (ref 40–130)
ALT SERPL-CCNC: 7 U/L (ref 0–33)
AMPHETAMINE SCREEN, URINE: NORMAL
ANION GAP SERPL CALCULATED.3IONS-SCNC: 12 MEQ/L (ref 9–15)
AST SERPL-CCNC: 10 U/L (ref 0–35)
BACTERIA: ABNORMAL /HPF
BARBITURATE SCREEN URINE: NORMAL
BASOPHILS ABSOLUTE: 0 K/UL (ref 0–0.2)
BASOPHILS RELATIVE PERCENT: 0.2 %
BENZODIAZEPINE SCREEN, URINE: NORMAL
BILIRUB SERPL-MCNC: <0.2 MG/DL (ref 0.2–0.7)
BILIRUBIN URINE: NEGATIVE
BLOOD, URINE: NEGATIVE
BUN BLDV-MCNC: 5 MG/DL (ref 6–20)
CALCIUM SERPL-MCNC: 8.8 MG/DL (ref 8.5–9.9)
CANNABINOID SCREEN URINE: NORMAL
CHLORIDE BLD-SCNC: 103 MEQ/L (ref 95–107)
CLARITY: ABNORMAL
CO2: 21 MEQ/L (ref 20–31)
COCAINE METABOLITE SCREEN URINE: NORMAL
COLOR: YELLOW
CREAT SERPL-MCNC: 0.33 MG/DL (ref 0.5–0.9)
CREATININE URINE: 117.7 MG/DL
EOSINOPHILS ABSOLUTE: 0.1 K/UL (ref 0–0.7)
EOSINOPHILS RELATIVE PERCENT: 0.4 %
EPITHELIAL CELLS, UA: ABNORMAL /HPF (ref 0–5)
GFR AFRICAN AMERICAN: >60
GFR NON-AFRICAN AMERICAN: >60
GLOBULIN: 2.8 G/DL (ref 2.3–3.5)
GLUCOSE BLD-MCNC: 92 MG/DL (ref 70–99)
GLUCOSE URINE: NEGATIVE MG/DL
HCT VFR BLD CALC: 32.5 % (ref 37–47)
HEMOGLOBIN: 10.9 G/DL (ref 12–16)
HYALINE CASTS: ABNORMAL /HPF (ref 0–5)
KETONES, URINE: NEGATIVE MG/DL
LEUKOCYTE ESTERASE, URINE: ABNORMAL
LYMPHOCYTES ABSOLUTE: 1.5 K/UL (ref 1–4.8)
LYMPHOCYTES RELATIVE PERCENT: 12.8 %
Lab: NORMAL
MCH RBC QN AUTO: 29.7 PG (ref 27–31.3)
MCHC RBC AUTO-ENTMCNC: 33.4 % (ref 33–37)
MCV RBC AUTO: 89 FL (ref 82–100)
METHADONE SCREEN, URINE: NORMAL
MONOCYTES ABSOLUTE: 0.6 K/UL (ref 0.2–0.8)
MONOCYTES RELATIVE PERCENT: 5 %
NEUTROPHILS ABSOLUTE: 9.6 K/UL (ref 1.4–6.5)
NEUTROPHILS RELATIVE PERCENT: 81.6 %
NITRITE, URINE: NEGATIVE
OPIATE SCREEN URINE: NORMAL
OXYCODONE URINE: NORMAL
PDW BLD-RTO: 12.7 % (ref 11.5–14.5)
PH UA: 7.5 (ref 5–9)
PHENCYCLIDINE SCREEN URINE: NORMAL
PLATELET # BLD: 268 K/UL (ref 130–400)
POTASSIUM SERPL-SCNC: 3.8 MEQ/L (ref 3.4–4.9)
PROPOXYPHENE SCREEN: NORMAL
PROTEIN PROTEIN: 34 MG/DL
PROTEIN UA: ABNORMAL MG/DL
PROTEIN/CREAT RATIO: 0.3 ML/ML
PROTEIN/CREAT RATIO: 0.3 ML/ML (ref 0–0.2)
RBC # BLD: 3.66 M/UL (ref 4.2–5.4)
RBC UA: ABNORMAL /HPF (ref 0–5)
SODIUM BLD-SCNC: 136 MEQ/L (ref 135–144)
SPECIFIC GRAVITY UA: 1.02 (ref 1–1.03)
TOTAL PROTEIN: 6.5 G/DL (ref 6.3–8)
URIC ACID, SERUM: 3.9 MG/DL (ref 2.4–5.7)
UROBILINOGEN, URINE: 0.2 E.U./DL
WBC # BLD: 11.8 K/UL (ref 4.8–10.8)
WBC UA: ABNORMAL /HPF (ref 0–5)

## 2022-05-09 PROCEDURE — 96372 THER/PROPH/DIAG INJ SC/IM: CPT

## 2022-05-09 PROCEDURE — 84550 ASSAY OF BLOOD/URIC ACID: CPT

## 2022-05-09 PROCEDURE — 80307 DRUG TEST PRSMV CHEM ANLYZR: CPT

## 2022-05-09 PROCEDURE — 6370000000 HC RX 637 (ALT 250 FOR IP): Performed by: OBSTETRICS & GYNECOLOGY

## 2022-05-09 PROCEDURE — 6360000002 HC RX W HCPCS: Performed by: OBSTETRICS & GYNECOLOGY

## 2022-05-09 PROCEDURE — 81001 URINALYSIS AUTO W/SCOPE: CPT

## 2022-05-09 PROCEDURE — 99213 OFFICE O/P EST LOW 20 MIN: CPT | Performed by: OBSTETRICS & GYNECOLOGY

## 2022-05-09 PROCEDURE — 84156 ASSAY OF PROTEIN URINE: CPT

## 2022-05-09 PROCEDURE — 85025 COMPLETE CBC W/AUTO DIFF WBC: CPT

## 2022-05-09 PROCEDURE — 99283 EMERGENCY DEPT VISIT LOW MDM: CPT

## 2022-05-09 PROCEDURE — 80053 COMPREHEN METABOLIC PANEL: CPT

## 2022-05-09 RX ORDER — ONDANSETRON 4 MG/1
4 TABLET, ORALLY DISINTEGRATING ORAL ONCE
Status: COMPLETED | OUTPATIENT
Start: 2022-05-09 | End: 2022-05-09

## 2022-05-09 RX ORDER — MULTIVITAMIN WITH IRON
50 TABLET ORAL DAILY
COMMUNITY

## 2022-05-09 RX ORDER — ACETAMINOPHEN 325 MG/1
650 TABLET ORAL EVERY 4 HOURS PRN
Status: DISCONTINUED | OUTPATIENT
Start: 2022-05-09 | End: 2022-05-09 | Stop reason: HOSPADM

## 2022-05-09 RX ORDER — BUTALBITAL, ACETAMINOPHEN AND CAFFEINE 300; 40; 50 MG/1; MG/1; MG/1
1 CAPSULE ORAL ONCE
Status: COMPLETED | OUTPATIENT
Start: 2022-05-09 | End: 2022-05-09

## 2022-05-09 RX ORDER — NALBUPHINE HCL 10 MG/ML
10 AMPUL (ML) INJECTION
Status: DISCONTINUED | OUTPATIENT
Start: 2022-05-09 | End: 2022-05-09 | Stop reason: HOSPADM

## 2022-05-09 RX ADMIN — NALBUPHINE HYDROCHLORIDE 10 MG: 10 INJECTION, SOLUTION INTRAMUSCULAR; INTRAVENOUS; SUBCUTANEOUS at 12:45

## 2022-05-09 RX ADMIN — ONDANSETRON 4 MG: 4 TABLET, ORALLY DISINTEGRATING ORAL at 12:46

## 2022-05-09 RX ADMIN — ACETAMINOPHEN 650 MG: 325 TABLET ORAL at 12:46

## 2022-05-09 RX ADMIN — BUTALBITA,ACETAMINOPHEN AND CAFFEINE 1 CAPSULE: 50; 300; 40 CAPSULE ORAL at 13:51

## 2022-05-09 ASSESSMENT — PAIN SCALES - GENERAL: PAINLEVEL_OUTOF10: 4

## 2022-05-09 NOTE — FLOWSHEET NOTE
Patient arrived to triage, complaining of dizziness, n/v, RUQ pain, HA. Patient states that she took Tylenol overnight and it helped her HA, but did not completely resolve HA. Patient states she has a history of pre-eclampsia with first baby. Is seeing [facility] in [facility] for care.

## 2022-05-09 NOTE — ED TRIAGE NOTES
Department of Obstetrics and Gynecology  Labor and Delivery  Javed Branch MD: Lázaro Davalos Note      SUBJECTIVE:  Patient is a 31 yo  female @ 34.3 weeks, EDC 2022 who presents with symptoms of HA, Nausea, and dizziness since last night. She reports onset of HA late last night, for which she drank water and took 1 Tylenol before going to bed. She reports getting rest, but woke up with same HA. She describes it as band-like pressure, with neck muscle stiffness. She also experienced one episode of nose bleed and bleeding gums. She reports dizziness, nose and gum bleeds are not uncommon in this pregnancy. She was more worried about HA, as she has history of Pre-Eclampsia with prior pregnancies. She reports +FM, denies LOF, VB, or VD. She takes 2 baby ASA for prophylactic prevention of Pre-E, but denies being on anti-hypertensives. She admits to drinking a lot of caffeine, but none recently. She reports that she is managed and followed  by [facility], and sees them every Tuesday for  testing.      OBJECTIVE    ROS:   HEENT: +HA, nose and gum bleed  GI: Nausea  CV: Dizziness  Abdomen: +FM  Rest of systems reviewed and found to be negative      Vitals:  BP (!) 151/82   Pulse 80   Temp 98.2 °F (36.8 °C) (Oral)   Resp 18   Ht 5' 9\" (1.753 m)   Wt 248 lb (112.5 kg)   SpO2 98%   BMI 36.62 kg/m²     PE:   Gen: AxO x3, In NAD  CV: RRR, No M/R/G  Lungs: CTAB, No W/C/R  Abdomen: Gravid, NTND  Cervix:  Not examined                   Fetal heart rate:  Category I        Baseline Heart Rate:  140s R       Accelerations:  present       Decelerations:  none       Variability:  moderate    Contraction frequency: None    DATA:    Time Roxydney Kayser Orders]       Results     Component Value Units   Comprehensive Metabolic Panel [[id number]] (Abnormal)    Collected: 22 1145    Updated: 22 1309    Specimen Source: Blood     Sodium 136 mEq/L    Potassium 3.8 mEq/L    Chloride 103 mEq/L    CO2 21 mEq/L    Anion Gap 12 mEq/L    Glucose 92 mg/dL    BUN 5 Low  mg/dL    CREATININE 0.33 Low  mg/dL    GFR Non- >60.0    Comment: >60 mL/min/1.73m2 EGFR, calc. for ages 25 and older using the   MDRD formula (not corrected for weight), is valid for stable   renal function. GFR  >60.0    Comment: >60 mL/min/1.73m2 EGFR, calc. for ages 25 and older using the   MDRD formula (not corrected for weight), is valid for stable   renal function. Calcium 8.8 mg/dL    Total Protein 6.5 g/dL    Albumin 3.7 g/dL    Total Bilirubin <0.2 mg/dL    Alkaline Phosphatase 98 U/L    ALT 7 U/L    AST 10 U/L    Globulin 2.8 g/dL   Uric Acid [[id number]]    Collected: 05/09/22 1145    Updated: 05/09/22 1309    Specimen Source: Blood     Uric Acid, Serum 3.9 mg/dL   PROTEIN TOTAL URINE RANDOM, ON [[id number]] (Abnormal)    Collected: 05/09/22 1200    Updated: 05/09/22 1301    Specimen Source: Urine, clean catch     Protein, Ur 34.0 mg/dL    Creatinine, Ur 117.7 mg/dL    Protein/Creat Ratio 0.3 High  mL/mL    Protein/Creat Ratio 0.3 mL/mL   Urine Drug Screen [[id number]]    Collected: 05/09/22 1130    Updated: 05/09/22 1256    Specimen Source: Urine, clean catch     Amphetamine Screen, Urine Neg    Barbiturate Screen, Ur Neg    Benzodiazepine Screen, Urine Neg    Cannabinoid Scrn, Ur Neg    Cocaine Metabolite Screen, Urine Neg    Opiate Scrn, Ur Neg    PCP Screen, Urine Neg    Methadone Screen, Urine Neg    Propoxyphene Scrn, Ur Neg    Oxycodone Urine Neg    Drug Screen Comment: see below    Comment: This method is a screening test to detect only these drug   classes as part of a medical workup.  Confirmatory testing   by another method should be ordered if clinically indicated.        Microscopic Urinalysis [[id number]] (Abnormal)    Collected: 05/09/22 1130    Updated: 05/09/22 1256     Bacteria, UA FEW Abnormal  /HPF    Hyaline Casts, UA 5-10 /HPF    WBC, UA 20-50 Abnormal  /HPF    RBC, UA 0-2 /HPF Epithelial Cells, UA 10-20 /HPF   Urinalysis [[id number]] (Abnormal)    Collected: 05/09/22 1130    Updated: 05/09/22 1254    Specimen Source: Urine, clean catch     Color, UA Yellow    Clarity, UA CLOUDY Abnormal     Glucose, Ur Negative mg/dL    Bilirubin Urine Negative    Ketones, Urine Negative mg/dL    Specific Gravity, UA 1.017    Blood, Urine Negative    pH, UA 7.5    Protein, UA TRACE Abnormal  mg/dL    Urobilinogen, Urine 0.2 E.U./dL    Nitrite, Urine Negative    Leukocyte Esterase, Urine MODERATE Abnormal    CBC with Auto Differential [[id number]] (Abnormal)    Collected: 05/09/22 1145    Updated: 05/09/22 1246    Specimen Source: Blood     WBC 11.8 High  K/uL    RBC 3.66 Low  M/uL    Hemoglobin 10.9 Low  g/dL    Hematocrit 32.5 Low  %    MCV 89.0 fL    MCH 29.7 pg    MCHC 33.4 %    RDW 12.7 %    Platelets 545 K/uL    Neutrophils % 81.6 %    Lymphocytes % 12.8 %    Monocytes % 5.0 %    Eosinophils % 0.4 %    Basophils % 0.2 %    Neutrophils Absolute 9.6 High  K/uL    Lymphocytes Absolute 1.5 K/uL    Monocytes Absolute 0.6 K/uL    Eosinophils Absolute 0.1 K/uL    Basophils Absolute 0.0              ASSESSMENT & PLAN:      Assessment:   1. IUP @ 34.3 weeks. 2. HA, rule out Pre-E      Plan:     Lab work obtained and patient sent home with 24 hour urine. NST is reactive. HA and nausea improved with Nubain and Fioricet. Patient discharged home with instructions, fetal kick counts and instructions to collect 24 hour urine for protein as outpatient. Patient will follow up with private OBGYN provider 5/10/2022.

## 2023-06-12 ENCOUNTER — OFFICE VISIT (OUTPATIENT)
Dept: PRIMARY CARE | Facility: CLINIC | Age: 28
End: 2023-06-12
Payer: COMMERCIAL

## 2023-06-12 VITALS
WEIGHT: 273 LBS | TEMPERATURE: 97.3 F | BODY MASS INDEX: 39.08 KG/M2 | RESPIRATION RATE: 18 BRPM | HEIGHT: 70 IN | HEART RATE: 78 BPM | SYSTOLIC BLOOD PRESSURE: 128 MMHG | DIASTOLIC BLOOD PRESSURE: 82 MMHG

## 2023-06-12 DIAGNOSIS — M25.551 CHRONIC HIP PAIN, BILATERAL: ICD-10-CM

## 2023-06-12 DIAGNOSIS — M25.552 CHRONIC HIP PAIN, BILATERAL: ICD-10-CM

## 2023-06-12 DIAGNOSIS — Z87.39 HISTORY OF SLIPPED CAPITAL FEMORAL EPIPHYSIS: ICD-10-CM

## 2023-06-12 DIAGNOSIS — G89.29 CHRONIC HIP PAIN, BILATERAL: ICD-10-CM

## 2023-06-12 DIAGNOSIS — F41.1 GENERALIZED ANXIETY DISORDER WITH PANIC ATTACKS: Primary | ICD-10-CM

## 2023-06-12 DIAGNOSIS — F41.0 GENERALIZED ANXIETY DISORDER WITH PANIC ATTACKS: Primary | ICD-10-CM

## 2023-06-12 PROBLEM — R10.11 RUQ DISCOMFORT: Status: ACTIVE | Noted: 2023-06-12

## 2023-06-12 PROBLEM — O99.810 ABNORMAL GLUCOSE TOLERANCE TEST (GTT) DURING PREGNANCY, ANTEPARTUM (HHS-HCC): Status: ACTIVE | Noted: 2020-08-05

## 2023-06-12 PROBLEM — R73.09 ELEVATED GLUCOSE TOLERANCE TEST: Status: ACTIVE | Noted: 2023-06-12

## 2023-06-12 PROBLEM — O13.9 PREGNANCY INDUCED HYPERTENSION, ANTEPARTUM (HHS-HCC): Status: RESOLVED | Noted: 2019-01-03 | Resolved: 2023-06-12

## 2023-06-12 PROBLEM — E66.9 OBESITY (BMI 30-39.9): Status: ACTIVE | Noted: 2018-08-20

## 2023-06-12 PROBLEM — O99.340 ANXIETY IN PREGNANCY, ANTEPARTUM (HHS-HCC): Status: ACTIVE | Noted: 2023-06-12

## 2023-06-12 PROBLEM — O26.893 HEADACHE IN PREGNANCY, ANTEPARTUM, THIRD TRIMESTER (HHS-HCC): Status: ACTIVE | Noted: 2023-06-12

## 2023-06-12 PROBLEM — F43.21 GRIEF: Status: ACTIVE | Noted: 2023-06-12

## 2023-06-12 PROBLEM — O14.13 SEVERE PRE-ECLAMPSIA IN THIRD TRIMESTER (HHS-HCC): Status: RESOLVED | Noted: 2023-06-12 | Resolved: 2023-06-12

## 2023-06-12 PROBLEM — O16.9 ELEVATED BLOOD PRESSURE AFFECTING PREGNANCY, ANTEPARTUM (HHS-HCC): Status: ACTIVE | Noted: 2023-06-12

## 2023-06-12 PROBLEM — F41.9 ANXIETY IN PREGNANCY, ANTEPARTUM (HHS-HCC): Status: ACTIVE | Noted: 2023-06-12

## 2023-06-12 PROBLEM — R19.7 DIARRHEA: Status: ACTIVE | Noted: 2023-06-12

## 2023-06-12 PROBLEM — S73.199A LABRAL TEAR OF HIP JOINT: Status: ACTIVE | Noted: 2023-06-12

## 2023-06-12 PROBLEM — O14.13 SEVERE PRE-ECLAMPSIA IN THIRD TRIMESTER (HHS-HCC): Status: ACTIVE | Noted: 2023-06-12

## 2023-06-12 PROBLEM — Z86.19 HX OF CHLAMYDIA INFECTION: Status: ACTIVE | Noted: 2018-08-20

## 2023-06-12 PROBLEM — O99.340 ANXIETY IN PREGNANCY, ANTEPARTUM (HHS-HCC): Status: RESOLVED | Noted: 2023-06-12 | Resolved: 2023-06-12

## 2023-06-12 PROBLEM — O09.299 HISTORY OF PRE-ECLAMPSIA IN PRIOR PREGNANCY, CURRENTLY PREGNANT (HHS-HCC): Status: RESOLVED | Noted: 2023-06-12 | Resolved: 2023-06-12

## 2023-06-12 PROBLEM — O13.3 GESTATIONAL HYPERTENSION, THIRD TRIMESTER (HHS-HCC): Status: ACTIVE | Noted: 2019-01-04

## 2023-06-12 PROBLEM — F42.9 OCD (OBSESSIVE COMPULSIVE DISORDER): Status: ACTIVE | Noted: 2023-06-12

## 2023-06-12 PROBLEM — O13.9 PREGNANCY INDUCED HYPERTENSION, ANTEPARTUM (HHS-HCC): Status: ACTIVE | Noted: 2019-01-03

## 2023-06-12 PROBLEM — O09.299 HISTORY OF PRE-ECLAMPSIA IN PRIOR PREGNANCY, CURRENTLY PREGNANT (HHS-HCC): Status: ACTIVE | Noted: 2023-06-12

## 2023-06-12 PROBLEM — R51.9 HEADACHE IN PREGNANCY, ANTEPARTUM, THIRD TRIMESTER (HHS-HCC): Status: RESOLVED | Noted: 2023-06-12 | Resolved: 2023-06-12

## 2023-06-12 PROBLEM — O13.3 GESTATIONAL HYPERTENSION, THIRD TRIMESTER (HHS-HCC): Status: RESOLVED | Noted: 2019-01-04 | Resolved: 2023-06-12

## 2023-06-12 PROBLEM — R51.9 HEADACHE IN PREGNANCY, ANTEPARTUM, THIRD TRIMESTER (HHS-HCC): Status: ACTIVE | Noted: 2023-06-12

## 2023-06-12 PROBLEM — E16.2 HYPOGLYCEMIA: Status: ACTIVE | Noted: 2023-06-12

## 2023-06-12 PROBLEM — R12 HEART BURN: Status: ACTIVE | Noted: 2018-10-19

## 2023-06-12 PROBLEM — M72.2 PLANTAR FASCIITIS, RIGHT: Status: ACTIVE | Noted: 2023-06-12

## 2023-06-12 PROBLEM — R55 PRE-SYNCOPE: Status: ACTIVE | Noted: 2023-06-12

## 2023-06-12 PROBLEM — O26.893 HEADACHE IN PREGNANCY, ANTEPARTUM, THIRD TRIMESTER (HHS-HCC): Status: RESOLVED | Noted: 2023-06-12 | Resolved: 2023-06-12

## 2023-06-12 PROBLEM — F41.9 ANXIETY IN PREGNANCY, ANTEPARTUM (HHS-HCC): Status: RESOLVED | Noted: 2023-06-12 | Resolved: 2023-06-12

## 2023-06-12 PROCEDURE — 99214 OFFICE O/P EST MOD 30 MIN: CPT | Performed by: FAMILY MEDICINE

## 2023-06-12 PROCEDURE — 1036F TOBACCO NON-USER: CPT | Performed by: FAMILY MEDICINE

## 2023-06-12 RX ORDER — ACETAMINOPHEN 500 MG
2 TABLET ORAL EVERY 6 HOURS PRN
COMMUNITY
Start: 2020-10-15 | End: 2023-12-11 | Stop reason: ALTCHOICE

## 2023-06-12 RX ORDER — ESCITALOPRAM OXALATE 20 MG/1
20 TABLET ORAL DAILY
Qty: 30 TABLET | Refills: 6 | Status: SHIPPED | OUTPATIENT
Start: 2023-06-12 | End: 2023-07-05 | Stop reason: SDUPTHER

## 2023-06-12 RX ORDER — DOCUSATE SODIUM 100 MG/1
1 CAPSULE, LIQUID FILLED ORAL 2 TIMES DAILY PRN
COMMUNITY
Start: 2020-03-30 | End: 2023-06-12 | Stop reason: ALTCHOICE

## 2023-06-12 RX ORDER — BUSPIRONE HYDROCHLORIDE 10 MG/1
10 TABLET ORAL 2 TIMES DAILY
Qty: 60 TABLET | Refills: 6 | Status: SHIPPED | OUTPATIENT
Start: 2023-06-12 | End: 2023-07-05 | Stop reason: SDUPTHER

## 2023-06-12 RX ORDER — METOCLOPRAMIDE 10 MG/1
TABLET ORAL
COMMUNITY
Start: 2020-06-02 | End: 2023-06-12 | Stop reason: ALTCHOICE

## 2023-06-12 RX ORDER — IBUPROFEN 600 MG/1
600 TABLET ORAL
COMMUNITY
Start: 2019-01-09 | End: 2023-06-12 | Stop reason: ALTCHOICE

## 2023-06-12 RX ORDER — ASPIRIN 81 MG/1
162 TABLET ORAL
COMMUNITY
Start: 2020-04-13 | End: 2023-06-12 | Stop reason: ALTCHOICE

## 2023-06-12 RX ORDER — FERROUS SULFATE 325(65) MG
325 TABLET ORAL
COMMUNITY
Start: 2020-04-13 | End: 2023-06-12 | Stop reason: ALTCHOICE

## 2023-06-12 RX ORDER — OMEPRAZOLE 20 MG/1
20 CAPSULE, DELAYED RELEASE ORAL 2 TIMES DAILY
COMMUNITY
Start: 2020-06-01 | End: 2023-06-12 | Stop reason: ALTCHOICE

## 2023-06-12 ASSESSMENT — ENCOUNTER SYMPTOMS
HIP PAIN: 1
NERVOUS/ANXIOUS: 1
PANIC: 1
HEADACHES: 1
IRRITABILITY: 1

## 2023-06-12 NOTE — PROGRESS NOTES
Jaz Castellon is a 27 y.o. female here today for   Chief Complaint   Patient presents with    Anxiety    Headache    Hip Pain        Anxiety  Presents for follow-up visit. Symptoms include excessive worry, irritability, nervous/anxious behavior and panic. Symptoms occur most days.       Headache   This is a recurrent problem. The current episode started more than 1 month ago. The problem occurs intermittently. The problem has been gradually worsening. The pain quality is similar to prior headaches.   Hip Pain   The incident occurred more than 1 week ago. The pain is present in the right hip and left hip. The pain is moderate.   Pt has pins in R hip, stated she needed to have hip replaced by the time she turns 30.  Has bone spurs in right hip.  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  She needs referral to new orthopedics.       Mood disorder recheck -- Patient feels like condition is well controlled.  Has good control of mood and emotional reactions.  She says there has been a lot of stress because she and her 's relationship has not been going well.  They are currently in marriage counseling and she is not sure if he wants to stay .  No SE's or problems with medications.  No homicidal or suicidal ideation.  Patient wishes to continue same medications.      Recent HA's because of stress.  She and  in marriage counseling.  OTC meds not helping much.          Objective    Visit Vitals  /82   Pulse 78   Temp 36.3 °C (97.3 °F)   Resp 18     Body mass index is 39.17 kg/m².     Physical Exam   General - Not in acute distress and cooperative.  Build & Nutrition - Well developed  Posture - Normal  Gait - Normal  Mental Status - alert and oriented x 3    Head - Normocephalic    Eyes - Bilateral - Sclera clear and lids pink without edema or mass.      Skin - Warm and dry with no rashes on visible skin    Neuropsychiatric - normal mood and  affect, well groomed and good eye contact.  Able to articulate well with normal speech/language, rate and coherence.  Associations are intact.  No evidence of hallucinations, delusions, obsessions or homicidal/suicidal ideation.  Attention span and ability to concentrate are normal.    Assessment    1. Generalized anxiety disorder with panic attacks  escitalopram (Lexapro) 20 mg tablet, busPIRone (Buspar) 10 mg tablet   She and her  are currently in marriage counseling.  Condition well controlled.  No change in current treatment regimen.  Refill given of current medication.  Make a follow up appointment with me for recheck in 6 months.     2. Chronic hip pain, bilateral  Referral to Orthopaedic Surgery   Patient has a history of slipped capital femoral epiphysis surgery at age 12.  She is having increasing pain in the right hip so [facility] and possible intervention.     3. History of slipped capital femoral epiphysis

## 2023-06-13 PROBLEM — Z87.39 HISTORY OF SLIPPED CAPITAL FEMORAL EPIPHYSIS: Status: ACTIVE | Noted: 2023-06-13

## 2023-07-05 ENCOUNTER — OFFICE VISIT (OUTPATIENT)
Dept: PRIMARY CARE | Facility: CLINIC | Age: 28
End: 2023-07-05
Payer: COMMERCIAL

## 2023-07-05 VITALS
HEIGHT: 70 IN | HEART RATE: 96 BPM | WEIGHT: 275 LBS | TEMPERATURE: 97 F | SYSTOLIC BLOOD PRESSURE: 132 MMHG | DIASTOLIC BLOOD PRESSURE: 90 MMHG | RESPIRATION RATE: 18 BRPM | BODY MASS INDEX: 39.37 KG/M2

## 2023-07-05 DIAGNOSIS — F41.0 GENERALIZED ANXIETY DISORDER WITH PANIC ATTACKS: ICD-10-CM

## 2023-07-05 DIAGNOSIS — F41.1 GENERALIZED ANXIETY DISORDER WITH PANIC ATTACKS: ICD-10-CM

## 2023-07-05 PROCEDURE — 99213 OFFICE O/P EST LOW 20 MIN: CPT | Performed by: FAMILY MEDICINE

## 2023-07-05 PROCEDURE — 1036F TOBACCO NON-USER: CPT | Performed by: FAMILY MEDICINE

## 2023-07-05 RX ORDER — BUSPIRONE HYDROCHLORIDE 10 MG/1
10 TABLET ORAL 2 TIMES DAILY
Qty: 90 TABLET | Refills: 1 | Status: SHIPPED | OUTPATIENT
Start: 2023-07-05 | End: 2023-12-11 | Stop reason: SDUPTHER

## 2023-07-05 RX ORDER — ESCITALOPRAM OXALATE 20 MG/1
20 TABLET ORAL DAILY
Qty: 90 TABLET | Refills: 1 | Status: SHIPPED | OUTPATIENT
Start: 2023-07-05 | End: 2024-03-11 | Stop reason: SDUPTHER

## 2023-07-05 NOTE — PROGRESS NOTES
Jaz Castellon is a 27 y.o. female here today for stress. Has been off medications past 3 weeks, could not afford.      HPI     Patient stopped taking buspirone 2 weeks ago b/c she did not have money for this.  She is moving out of her house to live with her mom in [facility] because she and her  have not been getting along still.  She says she has noticed increased anxiety and emotionality because of the relationship issues but it seems worse since she has been off the buspirone.  She is having a hard time controlling her emotions.  She completely denies any physical abuse in the household.  She denies any alcohol or drug use.  She is still taking the Lexapro 20 mg daily.  She says that her  recently told her that he no longer loves her and wants a divorce.  The patient denies any suicidal thoughts or ideation.  She denies any homicidal ideation.      Current Outpatient Medications:     acetaminophen (Tylenol) 500 mg tablet, Take 2 tablets (1,000 mg) by mouth every 6 hours if needed., Disp: , Rfl:     busPIRone (Buspar) 10 mg tablet, Take 1 tablet (10 mg) by mouth 2 times a day., Disp: 90 tablet, Rfl: 1    escitalopram (Lexapro) 20 mg tablet, Take 1 tablet (20 mg) by mouth once daily., Disp: 90 tablet, Rfl: 1    Patient Active Problem List   Diagnosis    Abnormal glucose tolerance test (GTT) during pregnancy, antepartum    Chronic hip pain, bilateral    Diarrhea    Elevated blood pressure affecting pregnancy, antepartum    Elevated glucose tolerance test    Generalized anxiety disorder with panic attacks    OCD (obsessive compulsive disorder)    Grief    Plantar fasciitis, right    Obesity (BMI 30-39.9)    Labral tear of hip joint    Hypoglycemia    Hx of chlamydia infection    Heart burn    Pre-syncope    RUQ discomfort    History of slipped capital femoral epiphysis         No results found for this or any previous visit (from the past 2016 hour(s)).     Objective    Visit Vitals  /90   Pulse  "96   Temp 36.1 °C (97 °F)   Resp 18   Ht 1.778 m (5' 10\")   Wt 125 kg (275 lb)   BMI 39.46 kg/m²     Body mass index is 39.46 kg/m².     Physical Exam   General - Not in acute distress and cooperative.  She is somewhat tearful and emotional as we are discussing her relationship with her .    Build & Nutrition - Well developed  Posture - Normal  Gait - Normal  Mental Status - alert and oriented x 3    Head - Normocephalic    Eyes - Bilateral - Sclera clear and lids pink without edema or mass.      Skin - Warm and dry with no rashes on visible skin    Neuropsychiatric - normal mood and affect, well groomed and good eye contact.  Able to articulate well with normal speech/language, rate and coherence.  Associations are intact.  No evidence of hallucinations, delusions, obsessions or homicidal/suicidal ideation.  Attention span and ability to concentrate are normal.    Assessment    1. Generalized anxiety disorder with panic attacks  busPIRone (Buspar) 10 mg tablet, escitalopram (Lexapro) 20 mg tablet   The patient's anxiety has worsened a lot because of her current life situation and she self discontinued BuSpar 2 weeks ago.  We will restart BuSpar at previous dose and she will continue to take Lexapro.  She will be moving to [place] with her daughters to live with her mother.  I recommend that she follow-up with me or a new primary care physician in 2 to 3 months to make sure that her mood disorder is stable.  I do not think a adjustment of medication dosage is needed at this time and I think that her anxiety has worsened a lot because she ran out of BuSpar.  We printed up some information about how to get BuSpar and Lexapro quite inexpensively through [facility].         "

## 2023-10-16 ENCOUNTER — HOSPITAL ENCOUNTER (OUTPATIENT)
Dept: MRI IMAGING | Age: 28
Discharge: HOME | End: 2023-10-18
Attending: ORTHOPAEDIC SURGERY
Payer: MEDICARE

## 2023-10-16 DIAGNOSIS — M87.052 AVASCULAR NECROSIS OF HIP, LEFT (HCC): ICD-10-CM

## 2023-10-16 DIAGNOSIS — M87.051 AVASCULAR NECROSIS OF BONE OF RIGHT HIP (HCC): ICD-10-CM

## 2023-10-16 PROCEDURE — 73721 MRI JNT OF LWR EXTRE W/O DYE: CPT

## 2023-11-15 ENCOUNTER — OFFICE VISIT (OUTPATIENT)
Dept: ORTHOPEDIC SURGERY | Facility: CLINIC | Age: 28
End: 2023-11-15
Payer: COMMERCIAL

## 2023-11-15 DIAGNOSIS — M70.62 TROCHANTERIC BURSITIS OF BOTH HIPS: Primary | ICD-10-CM

## 2023-11-15 DIAGNOSIS — M70.61 TROCHANTERIC BURSITIS OF BOTH HIPS: Primary | ICD-10-CM

## 2023-11-15 PROCEDURE — 1036F TOBACCO NON-USER: CPT | Performed by: ORTHOPAEDIC SURGERY

## 2023-11-15 PROCEDURE — 99213 OFFICE O/P EST LOW 20 MIN: CPT | Performed by: ORTHOPAEDIC SURGERY

## 2023-11-16 RX ORDER — OXAPROZIN 600 MG/1
1200 TABLET, FILM COATED ORAL DAILY
Qty: 60 TABLET | Refills: 11 | Status: SHIPPED | OUTPATIENT
Start: 2023-11-16 | End: 2024-11-15

## 2023-11-16 NOTE — PROGRESS NOTES
History of present illness    28-year-old female here for follow-up of her hip pain she had MRI both hips she has history of hip dysplasia she states she doing okay the Relafen seem to help but when she would take it every day it was causing her some stomach issues so she is not taking it on a regular basis only as needed she had her MRI at [facility] she did see her results through the portal      Past medical , Surgical, Family and social history reviewed.      Physical exam  General: No acute distress and breathing comfortably.  Patient is pleasant and cooperative with the examination.    Extremity  Mild tenderness in the bursal region of both hips and into the sciatic notch mild pain with internal ex rotation neurologically intact distally brisk cap refill compartments soft calves nontender    Diagnostics  MRI from [facility] is reviewed with her showing hip bursitis without evidence of labral tear or hip arthritis or AVN    No results found.     Procedure  [ none]    Assessment  Bursitis of the hip with hip dysplasia history of slipped capital femoral epiphysis    Treatment plan  1.  The natural history of the condition and its associated treatment alternatives including surgical and nonsurgical options were discussed with the patient at length.  2.  Explained this is typically treated nonsurgically    Different anti-inflammatory medication prescription for Daypro sent to pharmacy.  She is given a referral for outpatient physical therapy we discussed the possibility of cortisone injection.  She will follow-up with me in 6 weeks.  3. [   ]  4.  All of the patient's questions were answered.    This note was prepared using voice recognition software.  The details of this note are correct and have been reviewed, and corrected to the best of my ability.  Some grammatical areas may persist related to the Dragon software    Van Bridges MD  Senior Attending Physician  [facility]  [facility]  [facility] [place]    [phone]

## 2023-12-08 RX ORDER — NABUMETONE 500 MG/1
1 TABLET, FILM COATED ORAL EVERY 12 HOURS PRN
COMMUNITY
Start: 2023-07-12 | End: 2023-12-11 | Stop reason: ALTCHOICE

## 2023-12-08 RX ORDER — FAMOTIDINE 40 MG/1
1 TABLET, FILM COATED ORAL DAILY
COMMUNITY
Start: 2022-06-28 | End: 2023-12-11 | Stop reason: ALTCHOICE

## 2023-12-11 ENCOUNTER — OFFICE VISIT (OUTPATIENT)
Dept: PRIMARY CARE | Facility: CLINIC | Age: 28
End: 2023-12-11
Payer: COMMERCIAL

## 2023-12-11 VITALS
TEMPERATURE: 98 F | BODY MASS INDEX: 40.66 KG/M2 | DIASTOLIC BLOOD PRESSURE: 82 MMHG | HEIGHT: 70 IN | WEIGHT: 284 LBS | RESPIRATION RATE: 18 BRPM | HEART RATE: 84 BPM | SYSTOLIC BLOOD PRESSURE: 118 MMHG

## 2023-12-11 DIAGNOSIS — F41.1 GENERALIZED ANXIETY DISORDER WITH PANIC ATTACKS: ICD-10-CM

## 2023-12-11 DIAGNOSIS — F41.0 GENERALIZED ANXIETY DISORDER WITH PANIC ATTACKS: ICD-10-CM

## 2023-12-11 PROCEDURE — 1036F TOBACCO NON-USER: CPT | Performed by: FAMILY MEDICINE

## 2023-12-11 PROCEDURE — 99213 OFFICE O/P EST LOW 20 MIN: CPT | Performed by: FAMILY MEDICINE

## 2023-12-11 RX ORDER — BUSPIRONE HYDROCHLORIDE 15 MG/1
15 TABLET ORAL 2 TIMES DAILY
Qty: 60 TABLET | Refills: 6 | Status: SHIPPED | OUTPATIENT
Start: 2023-12-11 | End: 2024-03-11 | Stop reason: SDUPTHER

## 2023-12-11 NOTE — PROGRESS NOTES
"Jaz Castellon is a 28 y.o. female here today for   Chief Complaint   Patient presents with    Anxiety         I last saw the patient about 5 months ago and at that time she was planning on moving to [place] with her mom because she and her  are going through a divorce.  She is still living here in [place].  She and  decided she will stay here through [place] \"for the children\".  May eventually move to [facility] as we discussed previously.  She says she and her  are still not talking or communicating well.  She denies any domestic violence or abuse.    She is not sleeping well.  Anxiety is doing better since back on Buspar.  But she is still having some anxiety at times which is leading to trouble falling asleep.  Now on Lexapro too.  To bed at about 10 PM.  But has a hard time falling asleep.  Good sleep habits.  She has not tried melatonin to help her sleep.           Current Outpatient Medications:     oxaprozin (Daypro) 600 mg tablet, Take 2 tablets (1,200 mg) by mouth once daily., Disp: 60 tablet, Rfl: 11    busPIRone (Buspar) 15 mg tablet, Take 1 tablet (15 mg) by mouth 2 times a day., Disp: 60 tablet, Rfl: 6    escitalopram (Lexapro) 20 mg tablet, Take 1 tablet (20 mg) by mouth once daily., Disp: 90 tablet, Rfl: 1    Patient Active Problem List   Diagnosis    Abnormal glucose tolerance test (GTT) during pregnancy, antepartum    Chronic hip pain, bilateral    Diarrhea    Elevated blood pressure affecting pregnancy, antepartum    Elevated glucose tolerance test    Generalized anxiety disorder with panic attacks    OCD (obsessive compulsive disorder)    Grief    Plantar fasciitis, right    Obesity (BMI 30-39.9)    Labral tear of hip joint    Hypoglycemia    Hx of chlamydia infection    Heart burn    Pre-syncope    RUQ discomfort    History of slipped capital femoral epiphysis         No results found for this or any previous visit (from the past 672 hour(s)).     Objective    Visit " "Vitals  /82   Pulse 84   Temp 36.7 °C (98 °F)   Resp 18   Ht 1.778 m (5' 10\")   Wt 129 kg (284 lb)   BMI 40.75 kg/m²     Body mass index is 40.75 kg/m².     Physical Exam     General - Not in acute distress and cooperative.  Build & Nutrition - Well developed  Posture - Normal  Gait - Normal  Mental Status - alert and oriented x 3    Head - Normocephalic    Eyes - Bilateral - Sclera clear and lids pink without edema or mass.      Skin - Warm and dry with no rashes on visible skin    Neuropsychiatric - normal mood and affect, well groomed and good eye contact.  Able to articulate well with normal speech/language, rate and coherence.  Associations are intact.  No evidence of hallucinations, delusions, obsessions or homicidal/suicidal ideation.  Attention span and ability to concentrate are normal.  Assessment    1. Generalized anxiety disorder with panic attacks  busPIRone (Buspar) 15 mg tablet   Her anxiety has improved since we restarted BuSpar but it is still suboptimally controlled.  We will increase to 15 mg twice daily and she will continue Lexapro at the same dose.  If she is still living in [place] she should follow-up with me in 3 to 6 months or with her new primary care physician after she moves.         "

## 2024-01-02 ENCOUNTER — APPOINTMENT (OUTPATIENT)
Dept: ORTHOPEDIC SURGERY | Facility: CLINIC | Age: 29
End: 2024-01-02
Payer: COMMERCIAL

## 2024-03-11 ENCOUNTER — OFFICE VISIT (OUTPATIENT)
Dept: PRIMARY CARE | Facility: CLINIC | Age: 29
End: 2024-03-11
Payer: COMMERCIAL

## 2024-03-11 VITALS
HEART RATE: 71 BPM | BODY MASS INDEX: 40.8 KG/M2 | TEMPERATURE: 97 F | RESPIRATION RATE: 16 BRPM | HEIGHT: 70 IN | SYSTOLIC BLOOD PRESSURE: 130 MMHG | WEIGHT: 285 LBS | DIASTOLIC BLOOD PRESSURE: 80 MMHG

## 2024-03-11 DIAGNOSIS — F41.0 GENERALIZED ANXIETY DISORDER WITH PANIC ATTACKS: Primary | ICD-10-CM

## 2024-03-11 DIAGNOSIS — F41.1 GENERALIZED ANXIETY DISORDER WITH PANIC ATTACKS: Primary | ICD-10-CM

## 2024-03-11 PROCEDURE — 1036F TOBACCO NON-USER: CPT | Performed by: FAMILY MEDICINE

## 2024-03-11 PROCEDURE — 99213 OFFICE O/P EST LOW 20 MIN: CPT | Performed by: FAMILY MEDICINE

## 2024-03-11 RX ORDER — ESCITALOPRAM OXALATE 20 MG/1
20 TABLET ORAL DAILY
Qty: 30 TABLET | Refills: 6 | Status: SHIPPED | OUTPATIENT
Start: 2024-03-11

## 2024-03-11 RX ORDER — BUSPIRONE HYDROCHLORIDE 15 MG/1
15 TABLET ORAL 2 TIMES DAILY
Qty: 60 TABLET | Refills: 6 | Status: SHIPPED | OUTPATIENT
Start: 2024-03-11

## 2024-03-11 NOTE — PROGRESS NOTES
"Jaz Castellon is a 28 y.o. female here today for   Chief Complaint   Patient presents with    Anxiety        HPI     Mood disorder recheck -- Patient feels like condition is well controlled.  Has good control of mood and emotional reactions.  No SE's or problems with medications.  No homicidal or suicidal ideation.  Patient wishes to continue same medications.   Anxiety seems pretty well controlled with current meds.  She reports no major breakthrough anxiety and has not really been having any depression symptoms.  She says now planning on staying in [place].  She and  are staying together - relationship has improved.          Current Outpatient Medications:     busPIRone (Buspar) 15 mg tablet, Take 1 tablet (15 mg) by mouth 2 times a day., Disp: 60 tablet, Rfl: 6    escitalopram (Lexapro) 20 mg tablet, Take 1 tablet (20 mg) by mouth once daily., Disp: 30 tablet, Rfl: 6    oxaprozin (Daypro) 600 mg tablet, Take 2 tablets (1,200 mg) by mouth once daily. (Patient not taking: Reported on 3/11/2024), Disp: 60 tablet, Rfl: 11    Patient Active Problem List   Diagnosis    Abnormal glucose tolerance test (GTT) during pregnancy, antepartum    Chronic hip pain, bilateral    Diarrhea    Elevated blood pressure affecting pregnancy, antepartum    Elevated glucose tolerance test    Generalized anxiety disorder with panic attacks    OCD (obsessive compulsive disorder)    Grief    Plantar fasciitis, right    Obesity (BMI 30-39.9)    Labral tear of hip joint    Hypoglycemia    Hx of chlamydia infection    Heart burn    Pre-syncope    RUQ discomfort    History of slipped capital femoral epiphysis         No results found for this or any previous visit (from the past 672 hour(s)).     Objective    Visit Vitals    Visit Vitals  /80   Pulse 71   Temp 36.1 °C (97 °F)   Resp 16   Ht 1.778 m (5' 10\")   Wt 129 kg (285 lb)   BMI 40.89 kg/m²   OB Status Having periods   Smoking Status Never   BSA 2.52 m²       Body mass index " is 40.89 kg/m².     Physical Exam   General - Not in acute distress and cooperative.  Build & Nutrition - Well developed  Posture - Normal  Gait - Normal  Mental Status - alert and oriented x 3    Head - Normocephalic    Eyes - Bilateral - Sclera clear and lids pink without edema or mass.      Skin - Warm and dry with no rashes on visible skin    Neuropsychiatric - normal mood and affect, well groomed and good eye contact.  Able to articulate well with normal speech/language, rate and coherence.  Associations are intact.  No evidence of hallucinations, delusions, obsessions or homicidal/suicidal ideation.  Attention span and ability to concentrate are normal.    Assessment    1. Generalized anxiety disorder with panic attacks  busPIRone (Buspar) 15 mg tablet, escitalopram (Lexapro) 20 mg tablet   Condition well controlled.  No change in current treatment regimen.  Refill given of current medication.  Make a follow up appointment with me for recheck in 6 months.             Orders Placed This Encounter      busPIRone (Buspar) 15 mg tablet      escitalopram (Lexapro) 20 mg tablet       No orders of the defined types were placed in this encounter.       New Medications Ordered This Visit   Medications    busPIRone (Buspar) 15 mg tablet     Sig: Take 1 tablet (15 mg) by mouth 2 times a day.     Dispense:  60 tablet     Refill:  6    escitalopram (Lexapro) 20 mg tablet     Sig: Take 1 tablet (20 mg) by mouth once daily.     Dispense:  30 tablet     Refill:  6

## 2024-09-12 ENCOUNTER — APPOINTMENT (OUTPATIENT)
Dept: PRIMARY CARE | Facility: CLINIC | Age: 29
End: 2024-09-12
Payer: COMMERCIAL

## 2024-09-12 VITALS
HEIGHT: 70 IN | WEIGHT: 285 LBS | RESPIRATION RATE: 16 BRPM | SYSTOLIC BLOOD PRESSURE: 132 MMHG | TEMPERATURE: 96.5 F | DIASTOLIC BLOOD PRESSURE: 80 MMHG | BODY MASS INDEX: 40.8 KG/M2 | HEART RATE: 64 BPM

## 2024-09-12 DIAGNOSIS — F41.0 GENERALIZED ANXIETY DISORDER WITH PANIC ATTACKS: ICD-10-CM

## 2024-09-12 DIAGNOSIS — F41.1 GENERALIZED ANXIETY DISORDER WITH PANIC ATTACKS: ICD-10-CM

## 2024-09-12 PROCEDURE — 1036F TOBACCO NON-USER: CPT | Performed by: FAMILY MEDICINE

## 2024-09-12 PROCEDURE — 99213 OFFICE O/P EST LOW 20 MIN: CPT | Performed by: FAMILY MEDICINE

## 2024-09-12 PROCEDURE — 3008F BODY MASS INDEX DOCD: CPT | Performed by: FAMILY MEDICINE

## 2024-09-12 RX ORDER — BUSPIRONE HYDROCHLORIDE 15 MG/1
15 TABLET ORAL 2 TIMES DAILY
Qty: 60 TABLET | Refills: 6 | Status: CANCELLED | OUTPATIENT
Start: 2024-09-12

## 2024-09-12 RX ORDER — ESCITALOPRAM OXALATE 20 MG/1
20 TABLET ORAL DAILY
Qty: 30 TABLET | Refills: 6 | Status: CANCELLED | OUTPATIENT
Start: 2024-09-12

## 2024-09-12 RX ORDER — ESCITALOPRAM OXALATE 20 MG/1
20 TABLET ORAL DAILY
Qty: 30 TABLET | Refills: 6 | Status: SHIPPED | OUTPATIENT
Start: 2024-09-12

## 2024-09-12 RX ORDER — BUSPIRONE HYDROCHLORIDE 15 MG/1
15 TABLET ORAL 2 TIMES DAILY
Qty: 60 TABLET | Refills: 6 | Status: SHIPPED | OUTPATIENT
Start: 2024-09-12

## 2024-09-12 NOTE — PROGRESS NOTES
"Jaz Castellon is a 29 y.o. female here today for   Chief Complaint   Patient presents with    Anxiety        HPI     Mood disorder recheck -- Patient feels like condition is well controlled.  Has good control of mood and emotional reactions.  No SE's or problems with medications.  No homicidal or suicidal ideation.  Patient wishes to continue same medications.  She occasionally forgets to take her medications.  She has not noticed any significant breakthrough anxiety or depression feelings.  She feels like things are stable and would like to continue on these medications.        Current Outpatient Medications:     busPIRone (Buspar) 15 mg tablet, Take 1 tablet (15 mg) by mouth 2 times a day., Disp: 60 tablet, Rfl: 6    escitalopram (Lexapro) 20 mg tablet, Take 1 tablet (20 mg) by mouth once daily., Disp: 30 tablet, Rfl: 6    oxaprozin (Daypro) 600 mg tablet, Take 2 tablets (1,200 mg) by mouth once daily., Disp: 60 tablet, Rfl: 11    Patient Active Problem List   Diagnosis    Abnormal glucose tolerance test (GTT) during pregnancy, antepartum ([facility])    Chronic hip pain, bilateral    Diarrhea    Elevated blood pressure affecting pregnancy, antepartum ([facility])    Elevated glucose tolerance test    Generalized anxiety disorder with panic attacks    OCD (obsessive compulsive disorder)    Grief    Plantar fasciitis, right    Obesity (BMI 30-39.9)    Labral tear of hip joint    Hypoglycemia    Hx of chlamydia infection    Heart burn    Pre-syncope    RUQ discomfort    History of slipped capital femoral epiphysis         No results found for this or any previous visit (from the past 672 hour(s)).     Objective    Visit Vitals    Visit Vitals  /80   Pulse 64   Temp 35.8 °C (96.5 °F)   Resp 16   Ht 1.778 m (5' 10\")   Wt 129 kg (285 lb)   BMI 40.89 kg/m²   OB Status Having periods   Smoking Status Never   BSA 2.52 m²       Body mass index is 40.89 kg/m².     Physical Exam     General - Not in acute distress and " cooperative.  Build & Nutrition - Well developed  Posture - Normal  Gait - Normal  Mental Status - alert and oriented x 3    Head - Normocephalic    Eyes - Bilateral - Sclera clear and lids pink without edema or mass.      Skin - Warm and dry with no rashes on visible skin    Neuropsychiatric - normal mood and affect, well groomed and good eye contact.  Able to articulate well with normal speech/language, rate and coherence.  Associations are intact.  No evidence of hallucinations, delusions, obsessions or homicidal/suicidal ideation.  Attention span and ability to concentrate are normal.     Assessment    1. Generalized anxiety disorder with panic attacks  busPIRone (Buspar) 15 mg tablet, escitalopram (Lexapro) 20 mg tablet   Condition well controlled.  No change in current treatment regimen.  Refill given of current medication.  Make a follow up appointment with me for recheck in 6 months.             Orders Placed This Encounter      busPIRone (Buspar) 15 mg tablet      escitalopram (Lexapro) 20 mg tablet       No orders of the defined types were placed in this encounter.       New Medications Ordered This Visit   Medications    busPIRone (Buspar) 15 mg tablet     Sig: Take 1 tablet (15 mg) by mouth 2 times a day.     Dispense:  60 tablet     Refill:  6    escitalopram (Lexapro) 20 mg tablet     Sig: Take 1 tablet (20 mg) by mouth once daily.     Dispense:  30 tablet     Refill:  6

## 2025-03-08 SDOH — ECONOMIC STABILITY: FOOD INSECURITY: WITHIN THE PAST 12 MONTHS, THE FOOD YOU BOUGHT JUST DIDN'T LAST AND YOU DIDN'T HAVE MONEY TO GET MORE.: NEVER TRUE

## 2025-03-08 SDOH — ECONOMIC STABILITY: INCOME INSECURITY: IN THE LAST 12 MONTHS, WAS THERE A TIME WHEN YOU WERE NOT ABLE TO PAY THE MORTGAGE OR RENT ON TIME?: NO

## 2025-03-08 SDOH — ECONOMIC STABILITY: TRANSPORTATION INSECURITY
IN THE PAST 12 MONTHS, HAS LACK OF TRANSPORTATION KEPT YOU FROM MEETINGS, WORK, OR FROM GETTING THINGS NEEDED FOR DAILY LIVING?: NO

## 2025-03-08 SDOH — ECONOMIC STABILITY: FOOD INSECURITY: WITHIN THE PAST 12 MONTHS, YOU WORRIED THAT YOUR FOOD WOULD RUN OUT BEFORE YOU GOT MONEY TO BUY MORE.: NEVER TRUE

## 2025-03-08 SDOH — ECONOMIC STABILITY: TRANSPORTATION INSECURITY
IN THE PAST 12 MONTHS, HAS THE LACK OF TRANSPORTATION KEPT YOU FROM MEDICAL APPOINTMENTS OR FROM GETTING MEDICATIONS?: NO

## 2025-03-08 ASSESSMENT — PATIENT HEALTH QUESTIONNAIRE - PHQ9
SUM OF ALL RESPONSES TO PHQ QUESTIONS 1-9: 2
2. FEELING DOWN, DEPRESSED OR HOPELESS: SEVERAL DAYS
SUM OF ALL RESPONSES TO PHQ QUESTIONS 1-9: 2
SUM OF ALL RESPONSES TO PHQ QUESTIONS 1-9: 2
1. LITTLE INTEREST OR PLEASURE IN DOING THINGS: SEVERAL DAYS
SUM OF ALL RESPONSES TO PHQ9 QUESTIONS 1 & 2: 2
1. LITTLE INTEREST OR PLEASURE IN DOING THINGS: SEVERAL DAYS
SUM OF ALL RESPONSES TO PHQ QUESTIONS 1-9: 2
2. FEELING DOWN, DEPRESSED OR HOPELESS: SEVERAL DAYS

## 2025-03-11 ENCOUNTER — OFFICE VISIT (OUTPATIENT)
Dept: OBGYN CLINIC | Age: 30
End: 2025-03-11

## 2025-03-11 VITALS
HEIGHT: 69 IN | HEART RATE: 92 BPM | SYSTOLIC BLOOD PRESSURE: 108 MMHG | BODY MASS INDEX: 41.32 KG/M2 | DIASTOLIC BLOOD PRESSURE: 72 MMHG | WEIGHT: 279 LBS

## 2025-03-11 DIAGNOSIS — R07.9 CHEST PAIN, UNSPECIFIED TYPE: ICD-10-CM

## 2025-03-11 DIAGNOSIS — Z36.87 UNSURE OF LMP (LAST MENSTRUAL PERIOD) AS REASON FOR ULTRASOUND SCAN: ICD-10-CM

## 2025-03-11 DIAGNOSIS — Z87.59 HISTORY OF SEVERE PRE-ECLAMPSIA: ICD-10-CM

## 2025-03-11 DIAGNOSIS — Z87.59 HISTORY OF SEVERE PRE-ECLAMPSIA: Primary | ICD-10-CM

## 2025-03-11 LAB
AMPHET UR QL SCN: NORMAL
BACTERIA URNS QL MICRO: ABNORMAL /HPF
BARBITURATES UR QL SCN: NORMAL
BENZODIAZ UR QL SCN: NORMAL
BILIRUB UR QL STRIP: NEGATIVE
BUPRENORPHINE+NOR UR QL SCN: NORMAL
CANNABINOIDS UR QL SCN: NORMAL
CLARITY UR: ABNORMAL
COCAINE UR QL SCN: NORMAL
COLOR UR: YELLOW
DRUG SCREEN COMMENT UR-IMP: NORMAL
EPI CELLS #/AREA URNS AUTO: ABNORMAL /HPF (ref 0–5)
FENTANYL SCREEN, URINE: NORMAL
GLUCOSE UR STRIP-MCNC: NEGATIVE MG/DL
HGB UR QL STRIP: NEGATIVE
HYALINE CASTS #/AREA URNS AUTO: ABNORMAL /HPF (ref 0–5)
KETONES UR STRIP-MCNC: NEGATIVE MG/DL
LEUKOCYTE ESTERASE UR QL STRIP: ABNORMAL
METHADONE UR QL SCN: NORMAL
NITRITE UR QL STRIP: NEGATIVE
OPIATES UR QL SCN: NORMAL
OXYCODONE UR QL SCN: NORMAL
PCP UR QL SCN: NORMAL
PH UR STRIP: 6 [PH] (ref 5–9)
PROPOXYPH UR QL SCN: NORMAL
PROT UR STRIP-MCNC: ABNORMAL MG/DL
RBC #/AREA URNS AUTO: ABNORMAL /HPF (ref 0–5)
SP GR UR STRIP: 1.02 (ref 1–1.03)
UROBILINOGEN UR STRIP-ACNC: 0.2 E.U./DL
WBC #/AREA URNS AUTO: ABNORMAL /HPF (ref 0–5)

## 2025-03-11 RX ORDER — ONDANSETRON 4 MG/1
4 TABLET, FILM COATED ORAL EVERY 6 HOURS PRN
Qty: 30 TABLET | Refills: 1 | Status: SHIPPED | OUTPATIENT
Start: 2025-03-11

## 2025-03-11 RX ORDER — ASPIRIN 81 MG/1
162 TABLET ORAL DAILY
Qty: 60 TABLET | Refills: 2 | Status: SHIPPED | OUTPATIENT
Start: 2025-03-11

## 2025-03-11 NOTE — PROGRESS NOTES
Chaperone for Intimate Exam    1. Was chaperone offered as part of the rooming process? offered, accepted   2. If Chaperone is declined by patient, NA: chaperone was available and exam completed  3. Chaperone is Natasha Le LPN    
above  Integument/breast: negative  Behavioral/Psych: negative  Endocrine: negative     All other systems reviewed and are negative.       Physical Exam:  /72 (BP Site: Right Upper Arm, Patient Position: Sitting, BP Cuff Size: Large Adult)   Pulse 92   Ht 1.753 m (5' 9\")   Wt 126.6 kg (279 lb)   LMP 01/05/2025 (Approximate)   BMI 41.20 kg/m²   Skin: Warm, dry, no lesions or rashes  Extremities: Without clubbing, cyanosis and edema. Palms and nails are normal. Ambulates without difficulty  Neurological: No gross sensory or motor deficits.  Abdomen: Soft, non-tender without masses or organomegaly  bowel sounds normoactive  External Genitalia: Normal anatomy, no lesions or masses  Pubic Hair Distribution: Normal pattern and distribution  Pelvic Floor: Normal pelvic support, no significant cystocele or rectocele  Perineum: No fissures, lesions or leukoplakia  Urethra: Normal  Vagina: Moist, pink, supple, no lesions, no abnormal discharge  Cervix: Firm, nontender, no lesions  Uterus: firm, mobile, nontender, no masses or irregularities      Assessment:   1. History of severe pre-eclampsia    2. Chest pain, unspecified type    3. Unsure of LMP (last menstrual period) as reason for ultrasound scan        Plan:   Orders Placed This Encounter   Procedures    C.trachomatis N.gonorrhoeae DNA, Urine     Standing Status:   Future     Expected Date:   3/11/2025     Expiration Date:   3/10/2026    Culture, Urine     Standing Status:   Future     Expected Date:   3/11/2025     Expiration Date:   3/10/2026     Specify (ex-cath, midstream, cysto, etc)?:   midstream    CBC with Auto Differential     Standing Status:   Future     Expected Date:   3/11/2025     Expiration Date:   3/10/2026    Drug Screen Multi Urine With Bup     Standing Status:   Future     Expected Date:   3/11/2025     Expiration Date:   3/10/2026    HCG, Quantitative, Pregnancy     Standing Status:   Future     Expected Date:   3/11/2025     Expiration

## 2025-03-12 ENCOUNTER — RESULTS FOLLOW-UP (OUTPATIENT)
Dept: OBGYN CLINIC | Age: 30
End: 2025-03-12

## 2025-03-12 LAB — BACTERIA UR CULT: NORMAL

## 2025-03-12 RX ORDER — NITROFURANTOIN 25; 75 MG/1; MG/1
100 CAPSULE ORAL 2 TIMES DAILY
Qty: 10 CAPSULE | Refills: 0 | Status: SHIPPED | OUTPATIENT
Start: 2025-03-12 | End: 2025-03-17

## 2025-03-13 ENCOUNTER — APPOINTMENT (OUTPATIENT)
Dept: PRIMARY CARE | Facility: CLINIC | Age: 30
End: 2025-03-13
Payer: COMMERCIAL

## 2025-03-14 LAB
HPV HR 12 DNA SPEC QL NAA+PROBE: NOT DETECTED
HPV16 DNA SPEC QL NAA+PROBE: NOT DETECTED
HPV16+18+H RISK 12 DNA SPEC-IMP: NORMAL
HPV18 DNA SPEC QL NAA+PROBE: NOT DETECTED

## 2025-03-21 DIAGNOSIS — Z87.59 HISTORY OF SEVERE PRE-ECLAMPSIA: ICD-10-CM

## 2025-03-21 LAB
BASOPHILS # BLD: 0 K/UL (ref 0–0.2)
BASOPHILS NFR BLD: 0.4 %
EOSINOPHIL # BLD: 0.1 K/UL (ref 0–0.7)
EOSINOPHIL NFR BLD: 0.9 %
ERYTHROCYTE [DISTWIDTH] IN BLOOD BY AUTOMATED COUNT: 13.3 % (ref 11.5–14.5)
GONADOTROPIN, CHORIONIC (HCG) QUANT: NORMAL MIU/ML
HBV SURFACE AG SERPL QL IA: NORMAL
HCT VFR BLD AUTO: 35.2 % (ref 37–47)
HGB BLD-MCNC: 11.8 G/DL (ref 12–16)
LYMPHOCYTES # BLD: 2.1 K/UL (ref 1–4.8)
LYMPHOCYTES NFR BLD: 20.1 %
MCH RBC QN AUTO: 29.2 PG (ref 27–31.3)
MCHC RBC AUTO-ENTMCNC: 33.5 % (ref 33–37)
MCV RBC AUTO: 87.1 FL (ref 79.4–94.8)
MONOCYTES # BLD: 0.4 K/UL (ref 0.2–0.8)
MONOCYTES NFR BLD: 4.2 %
NEUTROPHILS # BLD: 7.8 K/UL (ref 1.4–6.5)
NEUTS SEG NFR BLD: 74 %
PLATELET # BLD AUTO: 320 K/UL (ref 130–400)
RBC # BLD AUTO: 4.04 M/UL (ref 4.2–5.4)
REAGIN+T PALLIDUM IGG+IGM SERPL-IMP: NORMAL
RUBELLA ANTIBODY IGG: 119.6 IU/ML
WBC # BLD AUTO: 10.5 K/UL (ref 4.8–10.8)

## 2025-03-22 LAB
ABO/RH: NORMAL
ANTIBODY SCREEN: NORMAL
HEPATITIS C ANTIBODY: NONREACTIVE
HIV AG/AB: NONREACTIVE

## 2025-03-24 LAB
HGB A1 MFR BLD: 96.8 % (ref 95–97.9)
HGB A2 MFR BLD: 2.9 % (ref 2–3.5)
HGB C MFR BLD: 0 % (ref 0–0)
HGB E MFR BLD: 0 % (ref 0–0)
HGB F MFR BLD: 0.3 % (ref 0–2.1)
HGB FRACT BLD ELPH-IMP: NORMAL
HGB OTHER MFR BLD: 0 % (ref 0–0)
HGB S BLD QL SOLY: NORMAL
HGB S MFR BLD: 0 % (ref 0–0)
PATH INTERP BLD-IMP: NORMAL

## 2025-04-01 ENCOUNTER — TELEPHONE (OUTPATIENT)
Dept: OBGYN CLINIC | Age: 30
End: 2025-04-01

## 2025-04-01 DIAGNOSIS — R07.9 CHEST PAIN DURING PREGNANCY: ICD-10-CM

## 2025-04-01 DIAGNOSIS — O99.891 CHEST PAIN DURING PREGNANCY: ICD-10-CM

## 2025-04-01 DIAGNOSIS — Z87.59 HISTORY OF SEVERE PRE-ECLAMPSIA: Primary | ICD-10-CM

## 2025-04-01 NOTE — TELEPHONE ENCOUNTER
Per Dr. Alvarez verbal, ok to order echo for patient but she really needs to be seen by Dr. Hernandez/cardiology.

## 2025-04-01 NOTE — TELEPHONE ENCOUNTER
Jessica from [facility] scheduling called, stated pt called them to set up an echo scan, however, does not have order for so. Pt advised to f/u with  but wants this done before then. Requesting order for echo scan be put in. Please advise and order if appropriate.

## 2025-04-08 ENCOUNTER — ROUTINE PRENATAL (OUTPATIENT)
Dept: OBGYN CLINIC | Age: 30
End: 2025-04-08
Payer: MEDICARE

## 2025-04-08 VITALS
WEIGHT: 277 LBS | HEART RATE: 80 BPM | SYSTOLIC BLOOD PRESSURE: 108 MMHG | DIASTOLIC BLOOD PRESSURE: 60 MMHG | BODY MASS INDEX: 40.91 KG/M2

## 2025-04-08 DIAGNOSIS — Z34.01 ENCOUNTER FOR SUPERVISION OF NORMAL FIRST PREGNANCY IN FIRST TRIMESTER: ICD-10-CM

## 2025-04-08 DIAGNOSIS — Z3A.13 13 WEEKS GESTATION OF PREGNANCY: Primary | ICD-10-CM

## 2025-04-08 PROCEDURE — 99213 OFFICE O/P EST LOW 20 MIN: CPT | Performed by: OBSTETRICS & GYNECOLOGY

## 2025-04-08 RX ORDER — OMEPRAZOLE 20 MG/1
20 CAPSULE, DELAYED RELEASE ORAL
Qty: 60 CAPSULE | Refills: 1 | Status: SHIPPED | OUTPATIENT
Start: 2025-04-08 | End: 2025-04-09

## 2025-04-08 RX ORDER — SIMETHICONE 80 MG
80 TABLET,CHEWABLE ORAL 4 TIMES DAILY PRN
Qty: 180 TABLET | Refills: 1 | Status: SHIPPED | OUTPATIENT
Start: 2025-04-08

## 2025-04-08 RX ORDER — DOCUSATE SODIUM 100 MG/1
100 CAPSULE, LIQUID FILLED ORAL 2 TIMES DAILY PRN
Qty: 60 CAPSULE | Refills: 2 | Status: SHIPPED | OUTPATIENT
Start: 2025-04-08

## 2025-04-08 NOTE — PROGRESS NOTES
non-invasive alternative testing was reviewed.     The literature regarding a questionable link to pitocin augmentation and induction of labor, the assistance of labor contractions and the initiation of contractions to help delivery, have been reviewed with the patient regarding the increased potential of having a  with Attention Deficit Hyperactivity Disorder and or Autism. These two disorders and the ramifications of their impact on a child and the family caring for that child has been reviewed with the patient in detail. She was given the risks, benefits and alternatives of the use of this medication. She has agreed to its use in the delivery of her unborn child if needed at the time of delivery, Yes.    The patient was questioned in detail regarding any genetic misnomer history, chromosomal abnormalities, or learning disabilities in  herself, the father of the baby or their families. SHE DENIED ANY HISTORY AS STATED ABOVE: No    Upon completion of the visit all questions were answered and the patients follow-up and testing schedule were reviewed.  Prenatal vitamins were given.    Initial labs drawn.  Prenatal vitamins.  Problem list reviewed and updated.  Counseled about QUAD/ NIPT  Role of ultrasound in pregnancy discussed  Follow-up in 2 weeks  Early 1 hr OGTT - indicated ordered  Hep C screen indicated : no  FLU- not given   TDAP- to be given in third trimester     Miranda Alvarez M.D., F.A.C.O.G     Transvaginal OB ultrasound done on 3/11/2025  Li gestation  Positive fetal heart tones at 160 bpm  Crown-rump length 24.8 mm consistent with 9 weeks and 2 days exactly, consistent with LMP the final OSWALDO is 10/12/2025 determined by LMP.  Adequate amniotic fluid  Normal placenta    Miranda Alvarez M.D., F.A.C.O.G '

## 2025-04-09 RX ORDER — OMEPRAZOLE 20 MG/1
20 CAPSULE, DELAYED RELEASE ORAL
Qty: 90 CAPSULE | Refills: 0 | Status: SHIPPED | OUTPATIENT
Start: 2025-04-09

## 2025-04-18 LAB
Lab: NORMAL
NTRA FETAL FRACTION: NORMAL
NTRA GENDER OF FETUS: NORMAL
NTRA MONOSOMY X AGE-BASED RISK TEXT: NORMAL
NTRA MONOSOMY X RESULT TEXT: NORMAL
NTRA MONOSOMY X RISK SCORE TEXT: NORMAL
NTRA TRIPLOIDY RESULT TEXT: NORMAL
NTRA TRISOMY 13 AGE-BASED RISK TEXT: NORMAL
NTRA TRISOMY 13 RESULT TEXT: NORMAL
NTRA TRISOMY 13 RISK SCORE TEXT: NORMAL
NTRA TRISOMY 18 AGE-BASED RISK TEXT: NORMAL
NTRA TRISOMY 18 RESULT TEXT: NORMAL
NTRA TRISOMY 18 RISK SCORE TEXT: NORMAL
NTRA TRISOMY 21 AGE-BASED RISK TEXT: NORMAL
NTRA TRISOMY 21 RESULT TEXT: NORMAL
NTRA TRISOMY 21 RISK SCORE TEXT: NORMAL

## 2025-04-28 ENCOUNTER — HOSPITAL ENCOUNTER (OUTPATIENT)
Age: 30
Discharge: HOME | End: 2025-04-30
Attending: OBSTETRICS & GYNECOLOGY
Payer: COMMERCIAL

## 2025-04-28 VITALS
DIASTOLIC BLOOD PRESSURE: 60 MMHG | HEIGHT: 69 IN | SYSTOLIC BLOOD PRESSURE: 108 MMHG | WEIGHT: 277 LBS | BODY MASS INDEX: 41.03 KG/M2

## 2025-04-28 DIAGNOSIS — O99.891 CHEST PAIN DURING PREGNANCY: ICD-10-CM

## 2025-04-28 DIAGNOSIS — Z87.59 HISTORY OF SEVERE PRE-ECLAMPSIA: ICD-10-CM

## 2025-04-28 DIAGNOSIS — R07.9 CHEST PAIN DURING PREGNANCY: ICD-10-CM

## 2025-04-28 LAB
ECHO AO ROOT DIAM: 2.3 CM
ECHO AO ROOT INDEX: 0.97 CM/M2
ECHO AV AREA PEAK VELOCITY: 1.6 CM2
ECHO AV AREA VTI: 1.7 CM2
ECHO AV AREA/BSA PEAK VELOCITY: 0.7 CM2/M2
ECHO AV AREA/BSA VTI: 0.7 CM2/M2
ECHO AV MEAN GRADIENT: 5 MMHG
ECHO AV MEAN VELOCITY: 1 M/S
ECHO AV PEAK GRADIENT: 9 MMHG
ECHO AV PEAK VELOCITY: 1.5 M/S
ECHO AV VELOCITY RATIO: 0.6
ECHO AV VTI: 33.2 CM
ECHO BSA: 2.47 M2
ECHO EST RA PRESSURE: 3 MMHG
ECHO LA DIAMETER INDEX: 1.9 CM/M2
ECHO LA DIAMETER: 4.5 CM
ECHO LA TO AORTIC ROOT RATIO: 1.96
ECHO LA VOL A-L A2C: 47 ML (ref 22–52)
ECHO LA VOL A-L A4C: 54 ML (ref 22–52)
ECHO LA VOL MOD A2C: 47 ML (ref 22–52)
ECHO LA VOL MOD A4C: 51 ML (ref 22–52)
ECHO LA VOLUME AREA LENGTH: 51 ML
ECHO LA VOLUME INDEX A-L A2C: 20 ML/M2 (ref 16–34)
ECHO LA VOLUME INDEX A-L A4C: 23 ML/M2 (ref 16–34)
ECHO LA VOLUME INDEX AREA LENGTH: 22 ML/M2 (ref 16–34)
ECHO LA VOLUME INDEX MOD A2C: 20 ML/M2 (ref 16–34)
ECHO LA VOLUME INDEX MOD A4C: 22 ML/M2 (ref 16–34)
ECHO LV E' LATERAL VELOCITY: 16.7 CM/S
ECHO LV E' SEPTAL VELOCITY: 18.69 CM/S
ECHO LV EDV A2C: 163 ML
ECHO LV EDV A4C: 125 ML
ECHO LV EDV BP: 148 ML (ref 56–104)
ECHO LV EDV INDEX A4C: 53 ML/M2
ECHO LV EDV INDEX BP: 62 ML/M2
ECHO LV EDV NDEX A2C: 69 ML/M2
ECHO LV EJECTION FRACTION 3D: 65 %
ECHO LV EJECTION FRACTION A2C: 72 %
ECHO LV EJECTION FRACTION A4C: 56 %
ECHO LV EJECTION FRACTION BIPLANE: 65 % (ref 55–100)
ECHO LV ESV A2C: 46 ML
ECHO LV ESV A4C: 55 ML
ECHO LV ESV BP: 51 ML (ref 19–49)
ECHO LV ESV INDEX A2C: 19 ML/M2
ECHO LV ESV INDEX A4C: 23 ML/M2
ECHO LV ESV INDEX BP: 22 ML/M2
ECHO LV FRACTIONAL SHORTENING: 37 % (ref 28–44)
ECHO LV INTERNAL DIMENSION DIASTOLE INDEX: 2.28 CM/M2
ECHO LV INTERNAL DIMENSION DIASTOLIC: 5.4 CM (ref 3.9–5.3)
ECHO LV INTERNAL DIMENSION SYSTOLIC INDEX: 1.43 CM/M2
ECHO LV INTERNAL DIMENSION SYSTOLIC: 3.4 CM
ECHO LV IVSD: 0.8 CM (ref 0.6–0.9)
ECHO LV IVSS: 1 CM
ECHO LV MASS 2D: 279.8 G (ref 67–162)
ECHO LV MASS INDEX 2D: 118.1 G/M2 (ref 43–95)
ECHO LV POSTERIOR WALL DIASTOLIC: 1.7 CM (ref 0.6–0.9)
ECHO LV POSTERIOR WALL SYSTOLIC: 2 CM
ECHO LV RELATIVE WALL THICKNESS RATIO: 0.63
ECHO LVOT AREA: 2.8 CM2
ECHO LVOT AV VTI INDEX: 0.6
ECHO LVOT DIAM: 1.9 CM
ECHO LVOT MEAN GRADIENT: 2 MMHG
ECHO LVOT PEAK GRADIENT: 3 MMHG
ECHO LVOT PEAK VELOCITY: 0.9 M/S
ECHO LVOT STROKE VOLUME INDEX: 23.9 ML/M2
ECHO LVOT SV: 56.7 ML
ECHO LVOT VTI: 20 CM
ECHO MV A VELOCITY: 0.52 M/S
ECHO MV E DECELERATION TIME (DT): 255.4 MS
ECHO MV E VELOCITY: 0.9 M/S
ECHO MV E/A RATIO: 1.73
ECHO MV E/E' LATERAL: 5.39
ECHO MV E/E' RATIO (AVERAGED): 5.1
ECHO MV E/E' SEPTAL: 4.82
ECHO PULMONARY ARTERY END DIASTOLIC PRESSURE: 3 MMHG
ECHO PV MAX VELOCITY: 1.3 M/S
ECHO PV PEAK GRADIENT: 6 MMHG
ECHO PV REGURGITANT MAX VELOCITY: 0.8 M/S
ECHO RIGHT VENTRICULAR SYSTOLIC PRESSURE (RVSP): 27 MMHG
ECHO RV INTERNAL DIMENSION: 3.3 CM
ECHO RV TAPSE: 3 CM (ref 1.7–?)
ECHO TV REGURGITANT MAX VELOCITY: 2.46 M/S
ECHO TV REGURGITANT PEAK GRADIENT: 24 MMHG

## 2025-04-28 PROCEDURE — C8929 TTE W OR WO FOL WCON,DOPPLER: HCPCS

## 2025-04-28 PROCEDURE — 93306 TTE W/DOPPLER COMPLETE: CPT | Performed by: INTERNAL MEDICINE

## 2025-04-28 PROCEDURE — 6360000004 HC RX CONTRAST MEDICATION: Performed by: OBSTETRICS & GYNECOLOGY

## 2025-04-28 RX ADMIN — SULFUR HEXAFLUORIDE 2 ML: 60.7; .19; .19 INJECTION, POWDER, LYOPHILIZED, FOR SUSPENSION INTRAVENOUS; INTRAVESICAL at 12:11

## 2025-05-06 ENCOUNTER — ROUTINE PRENATAL (OUTPATIENT)
Dept: OBGYN CLINIC | Age: 30
End: 2025-05-06

## 2025-05-06 VITALS
DIASTOLIC BLOOD PRESSURE: 70 MMHG | HEART RATE: 92 BPM | SYSTOLIC BLOOD PRESSURE: 132 MMHG | WEIGHT: 275 LBS | BODY MASS INDEX: 40.59 KG/M2

## 2025-05-06 DIAGNOSIS — Z34.01 ENCOUNTER FOR SUPERVISION OF NORMAL FIRST PREGNANCY IN FIRST TRIMESTER: Primary | ICD-10-CM

## 2025-05-06 DIAGNOSIS — Z87.59 HISTORY OF SEVERE PRE-ECLAMPSIA: ICD-10-CM

## 2025-05-06 DIAGNOSIS — Z3A.17 17 WEEKS GESTATION OF PREGNANCY: ICD-10-CM

## 2025-05-06 PROCEDURE — 1036F TOBACCO NON-USER: CPT | Performed by: OBSTETRICS & GYNECOLOGY

## 2025-05-06 PROCEDURE — G8419 CALC BMI OUT NRM PARAM NOF/U: HCPCS | Performed by: OBSTETRICS & GYNECOLOGY

## 2025-05-06 PROCEDURE — G8427 DOCREV CUR MEDS BY ELIG CLIN: HCPCS | Performed by: OBSTETRICS & GYNECOLOGY

## 2025-05-06 PROCEDURE — 0502F SUBSEQUENT PRENATAL CARE: CPT | Performed by: OBSTETRICS & GYNECOLOGY

## 2025-05-06 NOTE — PROGRESS NOTES
are negative.       Physical Exam:  /70   Pulse 92   Wt 124.7 kg (275 lb)   LMP 01/05/2025 (Approximate)   BMI 40.59 kg/m²   Skin: Warm, dry, no lesions or rashes  Extremities: Without clubbing, cyanosis and edema. Palms and nails are normal. Ambulates without difficulty  Neurological: No gross sensory or motor deficits.  Abdomen: Soft, non-tender without masses or organomegaly  bowel sounds normoactive    HOF : umbilical     FHT : 150 bpm       Assessment:   1. Encounter for supervision of normal first pregnancy in first trimester    2. 17 weeks gestation of pregnancy    3. History of severe pre-eclampsia            Plan:   Orders Placed This Encounter   Procedures    US OB 14 PLUS WEEKS SINGLE OR FIRST GESTATION     This procedure can be scheduled via [facility].      Standing Status:   Future     Expected Date:   5/6/2025     Expiration Date:   5/6/2026        No orders of the defined types were placed in this encounter.    Plan:   Miranda Alvarez MD  Assessment & Plan  1. Pregnancy.  She is currently 17.2 weeks pregnant. She has a history of severe preeclampsia in previous pregnancies. She will be started on a regimen of two baby aspirins daily. A referral to Dr. Martinez for an echocardiogram has been made to evaluate for potential cardiac damage. Chromosomal testing will be conducted at the 10-week rekha. A Pap smear will be performed during this visit. She has been advised to monitor her blood pressure closely and report any significant elevations. If she experiences nausea, she can take medication as needed.    2. Recurrent history of severe preeclampsia.  She has a history of severe preeclampsia, which has led to early deliveries in previous pregnancies. She will be started on two baby aspirins daily to help prevent recurrence. She has been advised to monitor her blood pressure closely and report any significant elevations. A referral to Dr. Martinez for an echocardiogram has been made to evaluate for potential

## 2025-05-09 ENCOUNTER — APPOINTMENT (OUTPATIENT)
Dept: GENERAL RADIOLOGY | Age: 30
End: 2025-05-09
Attending: STUDENT IN AN ORGANIZED HEALTH CARE EDUCATION/TRAINING PROGRAM
Payer: COMMERCIAL

## 2025-05-09 ENCOUNTER — HOSPITAL ENCOUNTER (EMERGENCY)
Age: 30
Discharge: HOME | End: 2025-05-09
Attending: STUDENT IN AN ORGANIZED HEALTH CARE EDUCATION/TRAINING PROGRAM
Payer: COMMERCIAL

## 2025-05-09 ENCOUNTER — TELEPHONE (OUTPATIENT)
Dept: OBGYN CLINIC | Age: 30
End: 2025-05-09

## 2025-05-09 VITALS
SYSTOLIC BLOOD PRESSURE: 138 MMHG | RESPIRATION RATE: 18 BRPM | DIASTOLIC BLOOD PRESSURE: 62 MMHG | BODY MASS INDEX: 40 KG/M2 | OXYGEN SATURATION: 100 % | HEART RATE: 82 BPM | TEMPERATURE: 98.1 F | WEIGHT: 271 LBS

## 2025-05-09 DIAGNOSIS — I10 HYPERTENSION, UNSPECIFIED TYPE: ICD-10-CM

## 2025-05-09 DIAGNOSIS — R51.9 ACUTE NONINTRACTABLE HEADACHE, UNSPECIFIED HEADACHE TYPE: Primary | ICD-10-CM

## 2025-05-09 LAB
ALBUMIN SERPL-MCNC: 3.7 G/DL (ref 3.5–4.6)
ALP SERPL-CCNC: 71 U/L (ref 40–130)
ALT SERPL-CCNC: 10 U/L (ref 0–33)
ANION GAP SERPL CALCULATED.3IONS-SCNC: 11 MEQ/L (ref 9–15)
AST SERPL-CCNC: 17 U/L (ref 0–35)
BASOPHILS # BLD: 0 K/UL (ref 0–0.2)
BASOPHILS NFR BLD: 0.2 %
BILIRUB SERPL-MCNC: <0.2 MG/DL (ref 0.2–0.7)
BILIRUB UR QL STRIP: NEGATIVE
BNP BLD-MCNC: 48 PG/ML
BUN SERPL-MCNC: 6 MG/DL (ref 6–20)
CALCIUM SERPL-MCNC: 8.7 MG/DL (ref 8.5–9.9)
CHLORIDE SERPL-SCNC: 105 MEQ/L (ref 95–107)
CLARITY UR: CLEAR
CO2 SERPL-SCNC: 19 MEQ/L (ref 20–31)
COLOR UR: YELLOW
CREAT SERPL-MCNC: 0.38 MG/DL (ref 0.5–0.9)
EOSINOPHIL # BLD: 0.1 K/UL (ref 0–0.7)
EOSINOPHIL NFR BLD: 1.2 %
ERYTHROCYTE [DISTWIDTH] IN BLOOD BY AUTOMATED COUNT: 13.3 % (ref 11.5–14.5)
GLOBULIN SER CALC-MCNC: 2.6 G/DL (ref 2.3–3.5)
GLUCOSE SERPL-MCNC: 101 MG/DL (ref 70–99)
GLUCOSE UR STRIP-MCNC: NEGATIVE MG/DL
HCT VFR BLD AUTO: 35.9 % (ref 37–47)
HGB BLD-MCNC: 11.9 G/DL (ref 12–16)
HGB UR QL STRIP: NEGATIVE
KETONES UR STRIP-MCNC: NEGATIVE MG/DL
LEUKOCYTE ESTERASE UR QL STRIP: NEGATIVE
LYMPHOCYTES # BLD: 1.7 K/UL (ref 1–4.8)
LYMPHOCYTES NFR BLD: 17.1 %
MCH RBC QN AUTO: 29.8 PG (ref 27–31.3)
MCHC RBC AUTO-ENTMCNC: 33.1 % (ref 33–37)
MCV RBC AUTO: 89.8 FL (ref 79.4–94.8)
MONOCYTES # BLD: 0.6 K/UL (ref 0.2–0.8)
MONOCYTES NFR BLD: 6.2 %
NEUTROPHILS # BLD: 7.3 K/UL (ref 1.4–6.5)
NEUTS SEG NFR BLD: 75 %
NITRITE UR QL STRIP: NEGATIVE
PH UR STRIP: 6.5 [PH] (ref 5–9)
PLATELET # BLD AUTO: 298 K/UL (ref 130–400)
POTASSIUM SERPL-SCNC: 4.2 MEQ/L (ref 3.4–4.9)
PROT SERPL-MCNC: 6.3 G/DL (ref 6.3–8)
PROT UR STRIP-MCNC: NEGATIVE MG/DL
RBC # BLD AUTO: 4 M/UL (ref 4.2–5.4)
SODIUM SERPL-SCNC: 135 MEQ/L (ref 135–144)
SP GR UR STRIP: 1.01 (ref 1–1.03)
TROPONIN, HIGH SENSITIVITY: <6 NG/L (ref 0–19)
URINE REFLEX TO CULTURE: NORMAL
UROBILINOGEN UR STRIP-ACNC: 0.2 E.U./DL
WBC # BLD AUTO: 9.8 K/UL (ref 4.8–10.8)

## 2025-05-09 PROCEDURE — 83880 ASSAY OF NATRIURETIC PEPTIDE: CPT

## 2025-05-09 PROCEDURE — 93005 ELECTROCARDIOGRAM TRACING: CPT | Performed by: STUDENT IN AN ORGANIZED HEALTH CARE EDUCATION/TRAINING PROGRAM

## 2025-05-09 PROCEDURE — 85025 COMPLETE CBC W/AUTO DIFF WBC: CPT

## 2025-05-09 PROCEDURE — 71045 X-RAY EXAM CHEST 1 VIEW: CPT

## 2025-05-09 PROCEDURE — 96374 THER/PROPH/DIAG INJ IV PUSH: CPT

## 2025-05-09 PROCEDURE — 99284 EMERGENCY DEPT VISIT MOD MDM: CPT

## 2025-05-09 PROCEDURE — 36415 COLL VENOUS BLD VENIPUNCTURE: CPT

## 2025-05-09 PROCEDURE — 80053 COMPREHEN METABOLIC PANEL: CPT

## 2025-05-09 PROCEDURE — 6360000002 HC RX W HCPCS: Performed by: STUDENT IN AN ORGANIZED HEALTH CARE EDUCATION/TRAINING PROGRAM

## 2025-05-09 PROCEDURE — 2580000003 HC RX 258: Performed by: STUDENT IN AN ORGANIZED HEALTH CARE EDUCATION/TRAINING PROGRAM

## 2025-05-09 PROCEDURE — 84484 ASSAY OF TROPONIN QUANT: CPT

## 2025-05-09 PROCEDURE — 6370000000 HC RX 637 (ALT 250 FOR IP): Performed by: STUDENT IN AN ORGANIZED HEALTH CARE EDUCATION/TRAINING PROGRAM

## 2025-05-09 PROCEDURE — 96375 TX/PRO/DX INJ NEW DRUG ADDON: CPT

## 2025-05-09 PROCEDURE — 81003 URINALYSIS AUTO W/O SCOPE: CPT

## 2025-05-09 RX ORDER — DIPHENHYDRAMINE HYDROCHLORIDE 50 MG/ML
25 INJECTION, SOLUTION INTRAMUSCULAR; INTRAVENOUS ONCE
Status: COMPLETED | OUTPATIENT
Start: 2025-05-09 | End: 2025-05-09

## 2025-05-09 RX ORDER — LABETALOL 200 MG/1
200 TABLET, FILM COATED ORAL 2 TIMES DAILY
Qty: 60 TABLET | Refills: 0 | Status: SHIPPED | OUTPATIENT
Start: 2025-05-09

## 2025-05-09 RX ORDER — LABETALOL 200 MG/1
200 TABLET, FILM COATED ORAL ONCE
Status: COMPLETED | OUTPATIENT
Start: 2025-05-09 | End: 2025-05-09

## 2025-05-09 RX ORDER — ONDANSETRON 2 MG/ML
4 INJECTION INTRAMUSCULAR; INTRAVENOUS ONCE
Status: COMPLETED | OUTPATIENT
Start: 2025-05-09 | End: 2025-05-09

## 2025-05-09 RX ORDER — ACETAMINOPHEN 500 MG
1000 TABLET ORAL ONCE
Status: COMPLETED | OUTPATIENT
Start: 2025-05-09 | End: 2025-05-09

## 2025-05-09 RX ORDER — 0.9 % SODIUM CHLORIDE 0.9 %
1000 INTRAVENOUS SOLUTION INTRAVENOUS ONCE
Status: COMPLETED | OUTPATIENT
Start: 2025-05-09 | End: 2025-05-09

## 2025-05-09 RX ADMIN — ONDANSETRON 4 MG: 2 INJECTION, SOLUTION INTRAMUSCULAR; INTRAVENOUS at 16:54

## 2025-05-09 RX ADMIN — ACETAMINOPHEN 1000 MG: 500 TABLET ORAL at 15:37

## 2025-05-09 RX ADMIN — LABETALOL HYDROCHLORIDE 200 MG: 200 TABLET, FILM COATED ORAL at 18:56

## 2025-05-09 RX ADMIN — DIPHENHYDRAMINE HYDROCHLORIDE 25 MG: 50 INJECTION INTRAMUSCULAR; INTRAVENOUS at 16:53

## 2025-05-09 RX ADMIN — SODIUM CHLORIDE 1000 ML: 0.9 INJECTION, SOLUTION INTRAVENOUS at 15:38

## 2025-05-09 ASSESSMENT — LIFESTYLE VARIABLES: HOW OFTEN DO YOU HAVE A DRINK CONTAINING ALCOHOL: NEVER

## 2025-05-09 NOTE — ED TRIAGE NOTES
Pt stated that she has a headache, is dizzy and has some SOB. Pt stated that she has a history of preeclampsia. Pt stated that she's 17 weeks 5 days

## 2025-05-09 NOTE — TELEPHONE ENCOUNTER
Pt called with concerns of a headache behind their left eye, pain going into their jaw, has experienced gum bleeding in the last 24hrs as well as having high bp readings. Last 2 at home before called were 180/100 & 148/95. Advised per nurse in office, to be seen further in the er for evaluation. Pt understood.

## 2025-05-09 NOTE — ED NOTES
Patient ambulated to the bathroom with this nurse on standby assist. No distress noted at this time

## 2025-05-09 NOTE — ED PROVIDER NOTES
[facility] EMERGENCY DEPARTMENT  Emergency Department Encounter  Emergency Medicine      Pt Name: Evy Troy  MRN:[id number]  Birthdate 1995  Date of evaluation: 25  Time: 3:23 PM EDT  PCP:  Torito Abdi MD    CHIEF COMPLAINT       Chief Complaint   Patient presents with    Hypertension    Headache       HISTORY OF PRESENT ILLNESS  (Location/Symptom, Timing/Onset, Context/Setting, Quality, Duration, ModifyingFactors, Severity.)      Evy Troy is a 29 y.o. female G4, P3 estimated 17 weeks 5 days presents with headache as well as elevated blood pressure.  Patient said she started getting a left-sided headache that is now to the right side.  She does get headaches sometimes.  It is not thunderclap in nature not the worst headache of her life, has been progressive and intermittent.  Not severe currently rated a 5 out of 10.  She said she googled the effects of Tylenol in pregnancy and she did not take it because her mother-in-law who is a nurse said that it could be bad for the baby.  She does not take any medications for the headache then.  Denies any neck or back pain no numbness or weakness no double or blurry vision.  Additionally she said she has some chest pressure at times she is supposed to see cardiology and recently had an echo.  Currently not having chest pain not short of breath no cough no fevers chills no leg swelling or calf pain no abdominal pain no vaginal fluid leakage or bleeding, has had mild nausea controlled with preggipops, no vomiting    PAST MEDICAL / SURGICAL / SOCIAL /FAMILY HISTORY      has a past medical history of Chlamydia, Complication of anesthesia, Elevated 1hr, Normal 3hr, Gestational diabetes mellitus, Hypertension, Mental disorder, Postpartum depression, Pre-eclampsia, Pregnancy induced hypertension, antepartum,  delivery,  labor, and PTSD (post-traumatic stress disorder).  No other pertinent PMH on review with patient/guardian.     has a past

## 2025-05-09 NOTE — DISCHARGE INSTRUCTIONS
It is okay to take Tylenol as needed for headache, do not take any NSAIDs such as Motrin, ibuprofen, naproxen    Drink plenty of fluids    Take the labetalol twice per day as prescribed for your blood pressure.  Please keep a log of your blood pressure, take it at least twice per day    Please call your OB/GYN for a follow-up appointment next week  Keep your follow-up appoint with cardiology as scheduled    Continue taking your baby aspirin twice per day, as well as your prenatal vitamins    Return to the emergency department for worsening or uncontrolled headache, neck or back pain, numbness or weakness, double or blurry vision, lightheadedness or dizziness or loss of consciousness, chest pain or shortness of breath, abdominal pain, vaginal fluid leakage or bleeding, burning with urination, fevers or chills, or other worsening symptoms or concerns

## 2025-05-10 LAB
EKG ATRIAL RATE: 69 BPM
EKG DIAGNOSIS: NORMAL
EKG P AXIS: 35 DEGREES
EKG P-R INTERVAL: 170 MS
EKG Q-T INTERVAL: 418 MS
EKG QRS DURATION: 84 MS
EKG QTC CALCULATION (BAZETT): 447 MS
EKG R AXIS: 33 DEGREES
EKG T AXIS: 10 DEGREES
EKG VENTRICULAR RATE: 69 BPM

## 2025-05-10 PROCEDURE — 93010 ELECTROCARDIOGRAM REPORT: CPT | Performed by: INTERNAL MEDICINE

## 2025-05-13 ENCOUNTER — OFFICE VISIT (OUTPATIENT)
Age: 30
End: 2025-05-13
Payer: COMMERCIAL

## 2025-05-13 VITALS
DIASTOLIC BLOOD PRESSURE: 60 MMHG | BODY MASS INDEX: 40.27 KG/M2 | HEART RATE: 95 BPM | WEIGHT: 272.8 LBS | OXYGEN SATURATION: 97 % | SYSTOLIC BLOOD PRESSURE: 118 MMHG

## 2025-05-13 DIAGNOSIS — R07.9 CHEST PAIN, UNSPECIFIED TYPE: ICD-10-CM

## 2025-05-13 DIAGNOSIS — I10 HYPERTENSION, UNSPECIFIED TYPE: ICD-10-CM

## 2025-05-13 DIAGNOSIS — Z87.59 HISTORY OF SEVERE PRE-ECLAMPSIA: ICD-10-CM

## 2025-05-13 DIAGNOSIS — O99.891 CHEST PAIN DURING PREGNANCY: ICD-10-CM

## 2025-05-13 DIAGNOSIS — Z41.2 ROUTINE AND RITUAL CIRCUMCISION: Primary | ICD-10-CM

## 2025-05-13 DIAGNOSIS — R06.09 DOE (DYSPNEA ON EXERTION): ICD-10-CM

## 2025-05-13 DIAGNOSIS — R07.9 CHEST PAIN DURING PREGNANCY: ICD-10-CM

## 2025-05-13 PROCEDURE — G8419 CALC BMI OUT NRM PARAM NOF/U: HCPCS | Performed by: INTERNAL MEDICINE

## 2025-05-13 PROCEDURE — 3078F DIAST BP <80 MM HG: CPT | Performed by: INTERNAL MEDICINE

## 2025-05-13 PROCEDURE — 3074F SYST BP LT 130 MM HG: CPT | Performed by: INTERNAL MEDICINE

## 2025-05-13 PROCEDURE — 99204 OFFICE O/P NEW MOD 45 MIN: CPT | Performed by: INTERNAL MEDICINE

## 2025-05-13 PROCEDURE — 1036F TOBACCO NON-USER: CPT | Performed by: INTERNAL MEDICINE

## 2025-05-13 PROCEDURE — G8427 DOCREV CUR MEDS BY ELIG CLIN: HCPCS | Performed by: INTERNAL MEDICINE

## 2025-05-13 ASSESSMENT — ENCOUNTER SYMPTOMS
GASTROINTESTINAL NEGATIVE: 1
WHEEZING: 0
COUGH: 0
EYES NEGATIVE: 1
BLOOD IN STOOL: 0
NAUSEA: 0
CHEST TIGHTNESS: 0
STRIDOR: 0

## 2025-05-13 NOTE — PROGRESS NOTES
Exam   Constitutional: She appears healthy. No distress.   HENT:   Normal cephalic and Atraumatic   Eyes: Pupils are equal, round, and reactive to light.   Neck: Thyroid normal. No JVD present. No neck adenopathy. No thyromegaly present.   Cardiovascular: Normal rate, regular rhythm, normal heart sounds, intact distal pulses and normal pulses.   Pulmonary/Chest: Effort normal and breath sounds normal. She has no wheezes. She has no rales. She exhibits no tenderness.   Abdominal: Soft. Bowel sounds are normal. There is no abdominal tenderness.   Musculoskeletal:         General: No tenderness or edema. Normal range of motion.      Cervical back: Normal range of motion and neck supple.   Neurological: She is alert and oriented to person, place, and time.   Skin: Skin is warm. No cyanosis. Nails show no clubbing.       LABS:  CBC:   Lab Results   Component Value Date/Time    WBC 9.8 05/09/2025 03:37 PM    RBC 4.00 05/09/2025 03:37 PM    HGB 11.9 05/09/2025 03:37 PM    HCT 35.9 05/09/2025 03:37 PM    MCV 89.8 05/09/2025 03:37 PM    MCH 29.8 05/09/2025 03:37 PM    MCHC 33.1 05/09/2025 03:37 PM    RDW 13.3 05/09/2025 03:37 PM     05/09/2025 03:37 PM     Lipids:No results found for: \"CHOL\"  No results found for: \"TRIG\"  No results found for: \"HDL\"  No components found for: \"LDLCHOLESTEROL\", \"LDLCALC\"  No results found for: \"VLDL\"  No results found for: \"CHOLHDLRATIO\"  CMP:    Lab Results   Component Value Date/Time     05/09/2025 03:37 PM    K 4.2 05/09/2025 03:37 PM     05/09/2025 03:37 PM    CO2 19 05/09/2025 03:37 PM    BUN 6 05/09/2025 03:37 PM    CREATININE 0.38 05/09/2025 03:37 PM    GFRAA >60.0 05/09/2022 11:45 AM    LABGLOM >90.0 05/09/2025 03:37 PM    GLUCOSE 101 05/09/2025 03:37 PM    CALCIUM 8.7 05/09/2025 03:37 PM    BILITOT <0.2 05/09/2025 03:37 PM    ALKPHOS 71 05/09/2025 03:37 PM    AST 17 05/09/2025 03:37 PM    ALT 10 05/09/2025 03:37 PM     BMP:    Lab Results   Component Value Date/Time

## 2025-05-19 ENCOUNTER — HOSPITAL ENCOUNTER (OUTPATIENT)
Dept: ULTRASOUND IMAGING | Age: 30
Discharge: HOME | End: 2025-05-21
Payer: COMMERCIAL

## 2025-05-19 DIAGNOSIS — Z34.01 ENCOUNTER FOR SUPERVISION OF NORMAL FIRST PREGNANCY IN FIRST TRIMESTER: ICD-10-CM

## 2025-05-19 PROCEDURE — 76805 OB US >/= 14 WKS SNGL FETUS: CPT

## 2025-05-19 PROCEDURE — 76817 TRANSVAGINAL US OBSTETRIC: CPT

## 2025-05-20 ENCOUNTER — HOSPITAL ENCOUNTER (EMERGENCY)
Age: 30
Discharge: HOME | End: 2025-05-20
Payer: COMMERCIAL

## 2025-05-20 VITALS
BODY MASS INDEX: 39.46 KG/M2 | SYSTOLIC BLOOD PRESSURE: 138 MMHG | HEART RATE: 67 BPM | OXYGEN SATURATION: 97 % | WEIGHT: 266.4 LBS | DIASTOLIC BLOOD PRESSURE: 76 MMHG | TEMPERATURE: 98.2 F | RESPIRATION RATE: 23 BRPM | HEIGHT: 69 IN

## 2025-05-20 DIAGNOSIS — R05.9 COUGH, UNSPECIFIED TYPE: ICD-10-CM

## 2025-05-20 DIAGNOSIS — R06.02 SHORTNESS OF BREATH: ICD-10-CM

## 2025-05-20 DIAGNOSIS — O16.9 HYPERTENSION AFFECTING PREGNANCY, ANTEPARTUM: Primary | ICD-10-CM

## 2025-05-20 LAB
ALBUMIN SERPL-MCNC: 3.6 G/DL (ref 3.5–4.6)
ALP SERPL-CCNC: 75 U/L (ref 40–130)
ALT SERPL-CCNC: 14 U/L (ref 0–33)
ANION GAP SERPL CALCULATED.3IONS-SCNC: 9 MEQ/L (ref 9–15)
AST SERPL-CCNC: 15 U/L (ref 0–35)
BASOPHILS # BLD: 0 K/UL (ref 0–0.2)
BASOPHILS NFR BLD: 0.1 %
BILIRUB SERPL-MCNC: <0.2 MG/DL (ref 0.2–0.7)
BILIRUB UR QL STRIP: NEGATIVE
BUN SERPL-MCNC: 6 MG/DL (ref 6–20)
CALCIUM SERPL-MCNC: 8.8 MG/DL (ref 8.5–9.9)
CHLORIDE SERPL-SCNC: 103 MEQ/L (ref 95–107)
CLARITY UR: CLEAR
CO2 SERPL-SCNC: 21 MEQ/L (ref 20–31)
COLOR UR: YELLOW
CREAT SERPL-MCNC: 0.45 MG/DL (ref 0.5–0.9)
EOSINOPHIL # BLD: 0.2 K/UL (ref 0–0.7)
EOSINOPHIL NFR BLD: 1.9 %
ERYTHROCYTE [DISTWIDTH] IN BLOOD BY AUTOMATED COUNT: 12.9 % (ref 11.5–14.5)
GLOBULIN SER CALC-MCNC: 3 G/DL (ref 2.3–3.5)
GLUCOSE SERPL-MCNC: 92 MG/DL (ref 70–99)
GLUCOSE UR STRIP-MCNC: NEGATIVE MG/DL
HCT VFR BLD AUTO: 32.8 % (ref 37–47)
HGB BLD-MCNC: 10.7 G/DL (ref 12–16)
HGB UR QL STRIP: NEGATIVE
KETONES UR STRIP-MCNC: NEGATIVE MG/DL
LEUKOCYTE ESTERASE UR QL STRIP: NEGATIVE
LYMPHOCYTES # BLD: 2.1 K/UL (ref 1–4.8)
LYMPHOCYTES NFR BLD: 25.5 %
MAGNESIUM SERPL-MCNC: 1.9 MG/DL (ref 1.7–2.4)
MCH RBC QN AUTO: 28.6 PG (ref 27–31.3)
MCHC RBC AUTO-ENTMCNC: 32.6 % (ref 33–37)
MCV RBC AUTO: 87.7 FL (ref 79.4–94.8)
MONOCYTES # BLD: 0.5 K/UL (ref 0.2–0.8)
MONOCYTES NFR BLD: 5.8 %
NEUTROPHILS # BLD: 5.5 K/UL (ref 1.4–6.5)
NEUTS SEG NFR BLD: 66.3 %
NITRITE UR QL STRIP: NEGATIVE
PH UR STRIP: 7 [PH] (ref 5–9)
PLATELET # BLD AUTO: 296 K/UL (ref 130–400)
POTASSIUM SERPL-SCNC: 3.6 MEQ/L (ref 3.4–4.9)
PROT SERPL-MCNC: 6.6 G/DL (ref 6.3–8)
PROT UR STRIP-MCNC: NEGATIVE MG/DL
RBC # BLD AUTO: 3.74 M/UL (ref 4.2–5.4)
SODIUM SERPL-SCNC: 133 MEQ/L (ref 135–144)
SP GR UR STRIP: 1.01 (ref 1–1.03)
URINE REFLEX TO CULTURE: NORMAL
UROBILINOGEN UR STRIP-ACNC: 1 E.U./DL
WBC # BLD AUTO: 8.3 K/UL (ref 4.8–10.8)

## 2025-05-20 PROCEDURE — 85025 COMPLETE CBC W/AUTO DIFF WBC: CPT

## 2025-05-20 PROCEDURE — 99284 EMERGENCY DEPT VISIT MOD MDM: CPT

## 2025-05-20 PROCEDURE — 93005 ELECTROCARDIOGRAM TRACING: CPT | Performed by: EMERGENCY MEDICINE

## 2025-05-20 PROCEDURE — 81003 URINALYSIS AUTO W/O SCOPE: CPT

## 2025-05-20 PROCEDURE — 80053 COMPREHEN METABOLIC PANEL: CPT

## 2025-05-20 PROCEDURE — 83735 ASSAY OF MAGNESIUM: CPT

## 2025-05-20 RX ORDER — LABETALOL HYDROCHLORIDE 5 MG/ML
10 INJECTION, SOLUTION INTRAVENOUS ONCE
Status: DISCONTINUED | OUTPATIENT
Start: 2025-05-20 | End: 2025-05-20

## 2025-05-20 ASSESSMENT — ENCOUNTER SYMPTOMS
NAUSEA: 0
VOMITING: 0
ABDOMINAL DISTENTION: 0
COUGH: 1
SHORTNESS OF BREATH: 1
ANAL BLEEDING: 0
DIARRHEA: 0
CONSTIPATION: 0
ABDOMINAL PAIN: 0

## 2025-05-20 ASSESSMENT — LIFESTYLE VARIABLES
HOW MANY STANDARD DRINKS CONTAINING ALCOHOL DO YOU HAVE ON A TYPICAL DAY: PATIENT DOES NOT DRINK
HOW OFTEN DO YOU HAVE A DRINK CONTAINING ALCOHOL: NEVER

## 2025-05-21 ENCOUNTER — TELEPHONE (OUTPATIENT)
Dept: OBGYN CLINIC | Age: 30
End: 2025-05-21

## 2025-05-21 LAB
EKG ATRIAL RATE: 71 BPM
EKG DIAGNOSIS: NORMAL
EKG P AXIS: 49 DEGREES
EKG P-R INTERVAL: 158 MS
EKG Q-T INTERVAL: 434 MS
EKG QRS DURATION: 84 MS
EKG QTC CALCULATION (BAZETT): 471 MS
EKG R AXIS: 38 DEGREES
EKG T AXIS: 10 DEGREES
EKG VENTRICULAR RATE: 71 BPM

## 2025-05-21 PROCEDURE — 93010 ELECTROCARDIOGRAM REPORT: CPT | Performed by: INTERNAL MEDICINE

## 2025-05-21 NOTE — ED TRIAGE NOTES
Patient states she has a history of preeclampsia and took her blood pressure at home and is was reading high and she state she has also been short of breathe for about 6 hours with a cough.

## 2025-05-21 NOTE — DISCHARGE INSTRUCTIONS
Follow-up with OB/GYN tomorrow.  Continue your labetalol as prescribed . monitor blood pressure.  Continue prenatal multivitamins as prescribed return to if any symptoms worsen or new symptoms develop

## 2025-05-21 NOTE — ED PROVIDER NOTES
[facility] EMERGENCY DEPARTMENT  EMERGENCY DEPARTMENT ENCOUNTER      Pt Name: Evy Troy  MRN: [id number]  Birthdate 1995  Date of evaluation: 5/20/2025  Provider: Jaleel Roche PA-C  Note Started: 5/20/25 11:30 PM EDT    CHIEF COMPLAINT       Chief Complaint   Patient presents with    Hypertension    Shortness of Breath    Headache         HISTORY OF PRESENT ILLNESS   (Location/Symptom, Timing/Onset, Context/Setting, Quality, Duration, Modifying Factors, Severity)  Note limiting factors.   Evy Troy is a 29 y.o. female who presents to the emergency department with complaints of headache, dizziness, and shortness of breath x1 day. Pt also complains of cough x1 month Patient states shortness of breath worsens with exertion and improves with rest.  Patient took her blood pressure at home earlier today with systolic blood pressure in the 160s.  Patient states she took her labetalol this morning but did not taken her evening dose. Patient does not regularly take her blood pressure at home only when she begins feeling symptoms.    HPI    Nursing Notes were reviewed.    REVIEW OF SYSTEMS    (2-9 systems for level 4, 10 or more for level 5)     Review of Systems   Respiratory:  Positive for cough and shortness of breath.    Cardiovascular:  Negative for leg swelling.   Gastrointestinal:  Negative for abdominal distention, abdominal pain, anal bleeding, constipation, diarrhea, nausea and vomiting.   Genitourinary:  Positive for frequency. Negative for decreased urine volume, difficulty urinating, dysuria, urgency and vaginal bleeding.   Musculoskeletal:  Negative for joint swelling.   Neurological:  Positive for dizziness and headaches.   Psychiatric/Behavioral:  Negative for sleep disturbance.    All other systems reviewed and are negative.      Except as noted above the remainder of the review of systems was reviewed and negative.       PAST MEDICAL HISTORY     Past Medical History:   Diagnosis

## 2025-05-21 NOTE — ED NOTES
Pt ambulated to the restroom without difficulty.  Patient states she is feeling better and has went through this with her prior pregnancies.

## 2025-05-21 NOTE — DISCHARGE INSTR - COC
in third trimester Z34.93    Admitted to labor and delivery Z78.9    Headache in pregnancy, antepartum, third trimester O26.893, R51.9       Isolation/Infection:   Isolation            No Isolation          Patient Infection Status    None to display              Nurse Assessment:  Last Vital Signs: /76   Pulse 67   Temp 98.2 °F (36.8 °C) (Oral)   Resp 23   Ht 1.753 m (5' 9\")   Wt 120.8 kg (266 lb 6.4 oz)   LMP 01/05/2025 (Approximate)   SpO2 97%   BMI 39.34 kg/m²     Last documented pain score (0-10 scale):    Last Weight:   Wt Readings from Last 1 Encounters:   05/20/25 120.8 kg (266 lb 6.4 oz)     Mental Status:  {IP PT MENTAL STATUS:20030}    IV Access:  { KACEY IV ACCESS:377152674}    Nursing Mobility/ADLs:  Walking   {CHP DME ADLs:952988417}  Transfer  {CHP DME ADLs:412661443}  Bathing  {CHP DME ADLs:094784773}  Dressing  {CHP DME ADLs:387498513}  Toileting  {CHP DME ADLs:738662903}  Feeding  {CHP DME ADLs:748342205}  Med Admin  {P DME ADLs:825771449}  Med Delivery   { KACEY MED Delivery:865390860}    Wound Care Documentation and Therapy:        Elimination:  Continence:   Bowel: {YES / NO:19727}  Bladder: {YES / NO:19727}  Urinary Catheter: {Urinary Catheter:568429898}   Colostomy/Ileostomy/Ileal Conduit: {YES / NO:19727}       Date of Last BM: ***  No intake or output data in the 24 hours ending 05/20/25 2348  No intake/output data recorded.    Safety Concerns:     { KACEY Safety Concerns:220395248}    Impairments/Disabilities:      { KACEY Impairments/Disabilities:917648608}    Nutrition Therapy:  Current Nutrition Therapy:   { KACEY Diet List:815502979}    Routes of Feeding: {CHP DME Other Feedings:224266154}  Liquids: {Slp liquid thickness:29794}  Daily Fluid Restriction: {CHP DME Yes amt example:165082022}  Last Modified Barium Swallow with Video (Video Swallowing Test): {Done Not Done Date:014977913}    Treatments at the Time of Hospital Discharge:   Respiratory Treatments: ***  Oxygen

## 2025-05-21 NOTE — TELEPHONE ENCOUNTER
Patient called to let us know that she's been to the ED twice now for her BP being high, a headache that won't go away, dizziness and SOB. On May 9 th her bp was 180/100 and then on May 20 bp was 170/80. Nurse scheduled patient for Friday and asked that she continue to log her BP.

## 2025-05-23 ENCOUNTER — ROUTINE PRENATAL (OUTPATIENT)
Dept: OBGYN CLINIC | Age: 30
End: 2025-05-23

## 2025-05-23 VITALS
WEIGHT: 272 LBS | BODY MASS INDEX: 40.17 KG/M2 | DIASTOLIC BLOOD PRESSURE: 62 MMHG | SYSTOLIC BLOOD PRESSURE: 114 MMHG | HEART RATE: 80 BPM

## 2025-05-23 DIAGNOSIS — O13.2 GESTATIONAL HYPERTENSION, SECOND TRIMESTER: ICD-10-CM

## 2025-05-23 DIAGNOSIS — R07.9 CHEST PAIN DURING PREGNANCY: ICD-10-CM

## 2025-05-23 DIAGNOSIS — O99.891 CHEST PAIN DURING PREGNANCY: ICD-10-CM

## 2025-05-23 DIAGNOSIS — Z34.01 ENCOUNTER FOR SUPERVISION OF NORMAL FIRST PREGNANCY IN FIRST TRIMESTER: Primary | ICD-10-CM

## 2025-05-23 DIAGNOSIS — O99.340 ANXIETY DURING PREGNANCY: ICD-10-CM

## 2025-05-23 DIAGNOSIS — F41.9 ANXIETY DURING PREGNANCY: ICD-10-CM

## 2025-05-23 RX ORDER — ESCITALOPRAM OXALATE 5 MG/1
5 TABLET ORAL DAILY
Qty: 30 TABLET | Refills: 5 | Status: SHIPPED | OUTPATIENT
Start: 2025-05-23

## 2025-05-23 RX ORDER — NIFEDIPINE 60 MG/1
60 TABLET, EXTENDED RELEASE ORAL DAILY
Qty: 30 TABLET | Refills: 0 | Status: SHIPPED | OUTPATIENT
Start: 2025-05-23

## 2025-05-23 NOTE — PROGRESS NOTES
OB visit      Evy Troy is a 25 y.o. year old female  @ L 25 , 19.5 wks , OSWALDO 10/12/25 confirmed by 9-week office ultrasound please see the report below. Complaints : nausea with vomiting for  Days  .   History of Present Illness  The patient presents for evaluation of pregnancy.    She is currently in her fourth pregnancy, with two children at home. Her last menstrual cycle was from 2025 to 2025. She has experienced one episode of vomiting and severe hyperosmia, which has been particularly challenging during meal preparation due to the aversion to food smells. She is not currently taking aspirin or any other medications, except for Tylenol. She attempted to take prenatal vitamins but found that the gummy form induced vomiting. She is interested in chromosomal testing. She has not undergone any cardiac evaluations despite her history of preeclampsia. She has a history of severe acid reflux during her previous pregnancies but reports no current issues. 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 She has previously received epidural injections, although the last one only provided numbness on her right side.    She has a history of preeclampsia, which necessitated an early delivery of her first child at 36 weeks in . Her second child was delivered vaginally at full term in , but she again experienced preeclampsia. Her third child was born prematurely at 36 weeks in  due to severe illness and elevated blood pressure, indicative of severe preeclampsia. She was diagnosed with gestational diabetes during her third pregnancy. She was prescribed labetalol postpartum but is not currently taking it. She has been monitoring her blood pressure at home, noting occasional elevations to 140 or 160 systolic during headaches. She continues to experience chest pain, including an episode this morning, but her blood

## 2025-06-03 ENCOUNTER — ROUTINE PRENATAL (OUTPATIENT)
Dept: OBGYN CLINIC | Age: 30
End: 2025-06-03

## 2025-06-03 VITALS
BODY MASS INDEX: 39.43 KG/M2 | WEIGHT: 267 LBS | DIASTOLIC BLOOD PRESSURE: 64 MMHG | SYSTOLIC BLOOD PRESSURE: 110 MMHG | HEART RATE: 88 BPM

## 2025-06-03 DIAGNOSIS — F41.9 ANXIETY DURING PREGNANCY: Primary | ICD-10-CM

## 2025-06-03 DIAGNOSIS — Z34.01 ENCOUNTER FOR SUPERVISION OF NORMAL FIRST PREGNANCY IN FIRST TRIMESTER: ICD-10-CM

## 2025-06-03 DIAGNOSIS — O99.340 ANXIETY DURING PREGNANCY: Primary | ICD-10-CM

## 2025-06-03 DIAGNOSIS — O13.2 GESTATIONAL HYPERTENSION, SECOND TRIMESTER: ICD-10-CM

## 2025-06-03 LAB
EKG ATRIAL RATE: 69 BPM
EKG ATRIAL RATE: 71 BPM
EKG DIAGNOSIS: NORMAL
EKG DIAGNOSIS: NORMAL
EKG P AXIS: 35 DEGREES
EKG P AXIS: 49 DEGREES
EKG P-R INTERVAL: 158 MS
EKG P-R INTERVAL: 170 MS
EKG Q-T INTERVAL: 418 MS
EKG Q-T INTERVAL: 434 MS
EKG QRS DURATION: 84 MS
EKG QRS DURATION: 84 MS
EKG QTC CALCULATION (BAZETT): 447 MS
EKG QTC CALCULATION (BAZETT): 471 MS
EKG R AXIS: 33 DEGREES
EKG R AXIS: 38 DEGREES
EKG T AXIS: 10 DEGREES
EKG T AXIS: 10 DEGREES
EKG VENTRICULAR RATE: 69 BPM
EKG VENTRICULAR RATE: 71 BPM

## 2025-06-03 PROCEDURE — 0502F SUBSEQUENT PRENATAL CARE: CPT | Performed by: OBSTETRICS & GYNECOLOGY

## 2025-06-03 PROCEDURE — 1036F TOBACCO NON-USER: CPT | Performed by: OBSTETRICS & GYNECOLOGY

## 2025-06-03 PROCEDURE — G8427 DOCREV CUR MEDS BY ELIG CLIN: HCPCS | Performed by: OBSTETRICS & GYNECOLOGY

## 2025-06-03 PROCEDURE — G8419 CALC BMI OUT NRM PARAM NOF/U: HCPCS | Performed by: OBSTETRICS & GYNECOLOGY

## 2025-06-03 NOTE — PROGRESS NOTES
OB visit      Evy Troy is a 25 y.o. year old female  @ L 25 , 21.2 wks , OSWALDO 10/12/25 confirmed by 9-week office ultrasound please see the report below. Complaints : nausea with vomiting for  Days  .   History of Present Illness  The patient presents for evaluation of pregnancy.    She is currently in her fourth pregnancy, with two children at home. Her last menstrual cycle was from 2025 to 2025. She has experienced one episode of vomiting and severe hyperosmia, which has been particularly challenging during meal preparation due to the aversion to food smells. She is not currently taking aspirin or any other medications, except for Tylenol. She attempted to take prenatal vitamins but found that the gummy form induced vomiting. She is interested in chromosomal testing. She has not undergone any cardiac evaluations despite her history of preeclampsia. She has a history of severe acid reflux during her previous pregnancies but reports no current issues. 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 She has previously received epidural injections, although the last one only provided numbness on her right side.    She has a history of preeclampsia, which necessitated an early delivery of her first child at 36 weeks in . Her second child was delivered vaginally at full term in , but she again experienced preeclampsia. Her third child was born prematurely at 36 weeks in  due to severe illness and elevated blood pressure, indicative of severe preeclampsia. She was diagnosed with gestational diabetes during her third pregnancy. She was prescribed labetalol postpartum but is not currently taking it. She has been monitoring her blood pressure at home, noting occasional elevations to 140 or 160 systolic during headaches. She continues to experience chest pain, including an episode this morning, but her blood

## 2025-06-04 ENCOUNTER — HOSPITAL ENCOUNTER (OUTPATIENT)
Age: 30
Discharge: HOME | End: 2025-06-06
Attending: INTERNAL MEDICINE
Payer: COMMERCIAL

## 2025-06-04 DIAGNOSIS — R07.9 CHEST PAIN, UNSPECIFIED TYPE: ICD-10-CM

## 2025-06-04 LAB
STRESS BASELINE DIAS BP: 71 MMHG
STRESS BASELINE HR: 89 BPM
STRESS BASELINE ST DEPRESSION: 0 MM
STRESS BASELINE SYS BP: 109 MMHG
STRESS ESTIMATED WORKLOAD: 7 METS
STRESS EXERCISE DUR MIN: 6 MIN
STRESS EXERCISE DUR SEC: 1 SEC
STRESS PEAK DIAS BP: 85 MMHG
STRESS PEAK SYS BP: 144 MMHG
STRESS PERCENT HR ACHIEVED: 79 %
STRESS POST PEAK HR: 150 BPM
STRESS RATE PRESSURE PRODUCT: NORMAL BPM*MMHG
STRESS ST DEPRESSION: 0 MM
STRESS TARGET HR: 191 BPM

## 2025-06-04 PROCEDURE — 93017 CV STRESS TEST TRACING ONLY: CPT

## 2025-06-04 PROCEDURE — 93018 CV STRESS TEST I&R ONLY: CPT | Performed by: INTERNAL MEDICINE

## 2025-06-17 RX ORDER — NIFEDIPINE 60 MG/1
60 TABLET, EXTENDED RELEASE ORAL DAILY
Qty: 30 TABLET | Refills: 0 | Status: SHIPPED | OUTPATIENT
Start: 2025-06-17

## 2025-07-02 ENCOUNTER — ROUTINE PRENATAL (OUTPATIENT)
Dept: OBGYN CLINIC | Age: 30
End: 2025-07-02

## 2025-07-02 VITALS
WEIGHT: 264 LBS | HEART RATE: 88 BPM | BODY MASS INDEX: 38.99 KG/M2 | SYSTOLIC BLOOD PRESSURE: 122 MMHG | DIASTOLIC BLOOD PRESSURE: 64 MMHG

## 2025-07-02 DIAGNOSIS — O13.2 GESTATIONAL HYPERTENSION, SECOND TRIMESTER: ICD-10-CM

## 2025-07-02 DIAGNOSIS — F41.9 ANXIETY DURING PREGNANCY: ICD-10-CM

## 2025-07-02 DIAGNOSIS — Z3A.25 25 WEEKS GESTATION OF PREGNANCY: Primary | ICD-10-CM

## 2025-07-02 DIAGNOSIS — O99.210 OBESITY IN PREGNANCY: ICD-10-CM

## 2025-07-02 DIAGNOSIS — O99.340 ANXIETY DURING PREGNANCY: ICD-10-CM

## 2025-07-02 PROCEDURE — G8427 DOCREV CUR MEDS BY ELIG CLIN: HCPCS | Performed by: OBSTETRICS & GYNECOLOGY

## 2025-07-02 PROCEDURE — 0502F SUBSEQUENT PRENATAL CARE: CPT | Performed by: OBSTETRICS & GYNECOLOGY

## 2025-07-02 PROCEDURE — G8419 CALC BMI OUT NRM PARAM NOF/U: HCPCS | Performed by: OBSTETRICS & GYNECOLOGY

## 2025-07-02 PROCEDURE — 1036F TOBACCO NON-USER: CPT | Performed by: OBSTETRICS & GYNECOLOGY

## 2025-07-02 RX ORDER — NIFEDIPINE 60 MG/1
60 TABLET, EXTENDED RELEASE ORAL DAILY
Qty: 30 TABLET | Refills: 3 | Status: SHIPPED | OUTPATIENT
Start: 2025-07-02

## 2025-07-02 RX ORDER — ESCITALOPRAM OXALATE 5 MG/1
5 TABLET ORAL DAILY
Qty: 30 TABLET | Refills: 5 | Status: SHIPPED | OUTPATIENT
Start: 2025-07-02

## 2025-07-02 RX ORDER — OMEPRAZOLE 20 MG/1
20 CAPSULE, DELAYED RELEASE ORAL
Qty: 60 CAPSULE | Refills: 1 | Status: SHIPPED | OUTPATIENT
Start: 2025-07-02

## 2025-07-02 NOTE — PROGRESS NOTES
OB visit      Evy Troy is a 25 y.o. year old female  @ L 25 , 25.3 wks , OSWALDO 10/12/25 confirmed by 9-week office ultrasound please see the report below. Complaints : nausea with vomiting for  Days  .   History of Present Illness  The patient presents for evaluation of pregnancy.    She is currently in her fourth pregnancy, with two children at home. Her last menstrual cycle was from 2025 to 2025. She has experienced one episode of vomiting and severe hyperosmia, which has been particularly challenging during meal preparation due to the aversion to food smells. She is not currently taking aspirin or any other medications, except for Tylenol. She attempted to take prenatal vitamins but found that the gummy form induced vomiting. She is interested in chromosomal testing. She has not undergone any cardiac evaluations despite her history of preeclampsia. She has a history of severe acid reflux during her previous pregnancies but reports no current issues. 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 She has previously received epidural injections, although the last one only provided numbness on her right side.    She has a history of preeclampsia, which necessitated an early delivery of her first child at 36 weeks in . Her second child was delivered vaginally at full term in , but she again experienced preeclampsia. Her third child was born prematurely at 36 weeks in  due to severe illness and elevated blood pressure, indicative of severe preeclampsia. She was diagnosed with gestational diabetes during her third pregnancy. She was prescribed labetalol postpartum but is not currently taking it. She has been monitoring her blood pressure at home, noting occasional elevations to 140 or 160 systolic during headaches. She continues to experience chest pain, including an episode this morning, but her blood

## 2025-07-19 ENCOUNTER — HOSPITAL ENCOUNTER (OUTPATIENT)
Age: 30
Discharge: HOME | End: 2025-07-20
Attending: OBSTETRICS & GYNECOLOGY | Admitting: OBSTETRICS & GYNECOLOGY
Payer: COMMERCIAL

## 2025-07-19 VITALS
DIASTOLIC BLOOD PRESSURE: 62 MMHG | WEIGHT: 267.7 LBS | BODY MASS INDEX: 39.53 KG/M2 | HEART RATE: 68 BPM | SYSTOLIC BLOOD PRESSURE: 137 MMHG | TEMPERATURE: 98.1 F | RESPIRATION RATE: 18 BRPM | OXYGEN SATURATION: 99 %

## 2025-07-19 DIAGNOSIS — Z3A.25 25 WEEKS GESTATION OF PREGNANCY: ICD-10-CM

## 2025-07-19 DIAGNOSIS — O26.893 HEADACHE IN PREGNANCY, ANTEPARTUM, THIRD TRIMESTER: Primary | ICD-10-CM

## 2025-07-19 DIAGNOSIS — R51.9 HEADACHE IN PREGNANCY, ANTEPARTUM, THIRD TRIMESTER: Primary | ICD-10-CM

## 2025-07-19 LAB
AMPHET UR QL SCN: NORMAL
BARBITURATES UR QL SCN: NORMAL
BASOPHILS # BLD: 0 K/UL (ref 0–0.2)
BASOPHILS NFR BLD: 0.2 %
BENZODIAZ UR QL SCN: NORMAL
BILIRUB UR QL STRIP: NEGATIVE
CANNABINOIDS UR QL SCN: NORMAL
CLARITY UR: ABNORMAL
COCAINE UR QL SCN: NORMAL
COLOR UR: YELLOW
CREAT UR-MCNC: 161.2 MG/DL
DRUG SCREEN COMMENT UR-IMP: NORMAL
EOSINOPHIL # BLD: 0.1 K/UL (ref 0–0.7)
EOSINOPHIL NFR BLD: 0.7 %
ERYTHROCYTE [DISTWIDTH] IN BLOOD BY AUTOMATED COUNT: 13.7 % (ref 11.5–14.5)
FENTANYL SCREEN, URINE: NORMAL
GLUCOSE UR STRIP-MCNC: NEGATIVE MG/DL
GLUCOSE, 1HR PP: 175 MG/DL (ref 60–140)
HCT VFR BLD AUTO: 32.8 % (ref 37–47)
HGB BLD-MCNC: 11.1 G/DL (ref 12–16)
HGB UR QL STRIP: NEGATIVE
KETONES UR STRIP-MCNC: ABNORMAL MG/DL
LEUKOCYTE ESTERASE UR QL STRIP: NEGATIVE
LYMPHOCYTES # BLD: 1.2 K/UL (ref 1–4.8)
LYMPHOCYTES NFR BLD: 12.9 %
MCH RBC QN AUTO: 29.5 PG (ref 27–31.3)
MCHC RBC AUTO-ENTMCNC: 33.8 % (ref 33–37)
MCV RBC AUTO: 87.2 FL (ref 79.4–94.8)
METHADONE UR QL SCN: NORMAL
MONOCYTES # BLD: 0.5 K/UL (ref 0.2–0.8)
MONOCYTES NFR BLD: 4.7 %
NEUTROPHILS # BLD: 7.8 K/UL (ref 1.4–6.5)
NEUTS SEG NFR BLD: 81.1 %
NITRITE UR QL STRIP: NEGATIVE
OPIATES UR QL SCN: NORMAL
OXYCODONE UR QL SCN: NORMAL
PCP UR QL SCN: NORMAL
PH UR STRIP: 6 [PH] (ref 5–9)
PLATELET # BLD AUTO: 293 K/UL (ref 130–400)
PROPOXYPH UR QL SCN: NORMAL
PROT UR STRIP-MCNC: NEGATIVE MG/DL
PROT UR-MCNC: 25 MG/DL
PROT/CREAT UR-RTO: 0.2 ML/ML
PROT/CREAT UR-RTO: 0.2 ML/ML (ref 0–0.2)
RBC # BLD AUTO: 3.76 M/UL (ref 4.2–5.4)
REAGIN+T PALLIDUM IGG+IGM SERPL-IMP: NORMAL
SP GR UR STRIP: 1.02 (ref 1–1.03)
URINE REFLEX TO CULTURE: ABNORMAL
UROBILINOGEN UR STRIP-ACNC: 1 E.U./DL
WBC # BLD AUTO: 9.6 K/UL (ref 4.8–10.8)

## 2025-07-19 PROCEDURE — 80307 DRUG TEST PRSMV CHEM ANLYZR: CPT

## 2025-07-19 PROCEDURE — 84156 ASSAY OF PROTEIN URINE: CPT

## 2025-07-19 PROCEDURE — 6370000000 HC RX 637 (ALT 250 FOR IP): Performed by: OBSTETRICS & GYNECOLOGY

## 2025-07-19 PROCEDURE — 81003 URINALYSIS AUTO W/O SCOPE: CPT

## 2025-07-19 RX ORDER — BUTALBITAL, ACETAMINOPHEN AND CAFFEINE 300; 40; 50 MG/1; MG/1; MG/1
1 CAPSULE ORAL EVERY 4 HOURS PRN
Status: DISCONTINUED | OUTPATIENT
Start: 2025-07-19 | End: 2025-07-20 | Stop reason: HOSPADM

## 2025-07-19 RX ORDER — LANOLIN ALCOHOL/MO/W.PET/CERES
50 CREAM (GRAM) TOPICAL DAILY
COMMUNITY

## 2025-07-19 RX ORDER — LABETALOL 200 MG/1
200 TABLET, FILM COATED ORAL EVERY 12 HOURS SCHEDULED
Status: DISCONTINUED | OUTPATIENT
Start: 2025-07-19 | End: 2025-07-20 | Stop reason: HOSPADM

## 2025-07-19 RX ADMIN — BUTALBITAL, ACETAMINOPHEN, AND CAFFEINE CAPSULES 1 CAPSULE: 50; 300; 40 CAPSULE ORAL at 23:08

## 2025-07-19 RX ADMIN — LABETALOL HYDROCHLORIDE 200 MG: 200 TABLET, FILM COATED ORAL at 23:08

## 2025-07-19 ASSESSMENT — PAIN SCALES - GENERAL: PAINLEVEL_OUTOF10: 4

## 2025-07-20 PROBLEM — O10.919 CHRONIC HYPERTENSION AFFECTING PREGNANCY: Status: ACTIVE | Noted: 2025-07-20

## 2025-07-20 PROCEDURE — 99285 EMERGENCY DEPT VISIT HI MDM: CPT

## 2025-07-20 PROCEDURE — 99284 EMERGENCY DEPT VISIT MOD MDM: CPT | Performed by: OBSTETRICS & GYNECOLOGY

## 2025-07-20 PROCEDURE — 59025 FETAL NON-STRESS TEST: CPT | Performed by: OBSTETRICS & GYNECOLOGY

## 2025-07-20 RX ORDER — BUTALBITAL, ACETAMINOPHEN AND CAFFEINE 50; 325; 40 MG/1; MG/1; MG/1
1 TABLET ORAL EVERY 4 HOURS PRN
Qty: 60 TABLET | Refills: 3 | Status: SHIPPED | OUTPATIENT
Start: 2025-07-20

## 2025-07-20 RX ORDER — LABETALOL 100 MG/1
200 TABLET, FILM COATED ORAL 2 TIMES DAILY
Qty: 60 TABLET | Refills: 3 | Status: SHIPPED | OUTPATIENT
Start: 2025-07-20

## 2025-07-20 NOTE — FLOWSHEET NOTE
Dr Pantoja in to see pt. Informed pt he will send her scripts for the 2 medications he gave her here to her Pharmacy. He has also instructed the pt to call the office on Monday to be seen this week by Dr Bennett or him. Pt expressed understanding.

## 2025-07-20 NOTE — ED TRIAGE NOTES
Department of Obstetrics and Gynecology  Labor and Delivery  Attending Triage Note      SUBJECTIVE:  29 y.o.   @ 28w0d  Presents with complaints of No chief complaint on file.  Headache and elevated BP    Fetal Movement    Yes  ROM No  Vaginal Bleeding No  Contractions No    OBJECTIVE    Vitals:  /62   Pulse 68   Temp 98.1 °F (36.7 °C) (Oral)   Resp 18   Wt 121.4 kg (267 lb 11.2 oz)   LMP 2025 (Approximate)   SpO2 99%   BMI 39.53 kg/m²     CONSTITUTIONAL:  awake, alert, cooperative, no apparent distress, and appears stated age    Cervix:  deferred        Membranes:  Intact    Fetal heart rate:         Baseline Heart Rate:  150        Accelerations:  present       Decelerations:  none       Variability:  moderate  Reactive for 28 weeks. Indication chronic hypertension  Contraction frequency: 0 minutes        DATA:  Labs Reviewed   URINALYSIS WITH REFLEX TO CULTURE - Abnormal; Notable for the following components:       Result Value    Clarity, UA CLOUDY (*)     Ketones, Urine TRACE (*)     All other components within normal limits   URINE DRUG SCREEN   PROTEIN TOTAL URINE RANDOM, ON       ASSESSMENT & PLAN:   1) IUP at 28 weeks  Chronic hypertension  Headache  Continue the procardia 60mg xl daily   Labetalol 200mg  bid  Fioricet for headache     2) D/C to home  3) make appointment with OB provider for bp check this week  Continue to monitor the bp at home    Guillermo Pantoja DO

## 2025-07-20 NOTE — FLOWSHEET NOTE
Lights remain dimmed. Pt encouraged to rest. Has B/P cuff at home and log. Instructed to continue to do twice daily and take to each appointment. Pt stated  MD stated only when a headache occurs. Encouraged while on medications to do it twice daily and with headaches.

## 2025-07-20 NOTE — FLOWSHEET NOTE
Pt arrived via wheelchair to OB Triage with C/O \"pre-eclamptic symptoms.\" Pt sates she has had a headache for the past 24 hours without relief from Tylenol. Currently on Procardia, ran out of Labetalol and MD did not resume it. Originally ordered by the ER. Pt states she had this issue with Hypertension with all  of her other pregnancies. ER did not take a Blood Pressure  prior to sending pt up to OB. UA obtained.

## 2025-07-20 NOTE — FLOWSHEET NOTE
In to reassess blood pressure and headache. Admits to some relief of headache. Dr Pantoja aware will be in to see pt. [place] removed. Pt continues to deny abdominal pain.

## 2025-07-20 NOTE — FLOWSHEET NOTE
Call placed to Dr Pantoja In House MD. Made aware of pt's HX UA and Random UA protein results. Informed of current Treatment for hypertension and recent medication that had not been reordered by attending. New orders obtained.

## 2025-07-28 DIAGNOSIS — R73.09 ABNORMAL GLUCOSE TOLERANCE TEST: Primary | ICD-10-CM

## 2025-07-30 ENCOUNTER — ROUTINE PRENATAL (OUTPATIENT)
Dept: OBGYN CLINIC | Age: 30
End: 2025-07-30

## 2025-07-30 VITALS
WEIGHT: 264 LBS | BODY MASS INDEX: 38.99 KG/M2 | SYSTOLIC BLOOD PRESSURE: 112 MMHG | DIASTOLIC BLOOD PRESSURE: 80 MMHG | HEART RATE: 84 BPM

## 2025-07-30 DIAGNOSIS — Z34.01 ENCOUNTER FOR SUPERVISION OF NORMAL FIRST PREGNANCY IN FIRST TRIMESTER: ICD-10-CM

## 2025-07-30 DIAGNOSIS — R73.09 ABNORMAL GLUCOSE TOLERANCE TEST: ICD-10-CM

## 2025-07-30 DIAGNOSIS — Z3A.29 29 WEEKS GESTATION OF PREGNANCY: Primary | ICD-10-CM

## 2025-07-30 DIAGNOSIS — O99.210 OBESITY IN PREGNANCY: ICD-10-CM

## 2025-07-30 LAB
GLUCOSE 2H P 100 G GLC PO SERPL-MCNC: 136 MG/DL
GLUCOSE 3H P 100 G GLC PO SERPL-MCNC: 56 MG/DL
GLUCOSE BS SERPL-MCNC: 81 MG/DL
GLUCOSE TOLERANCE TEST 1 HOUR: 176 MG/DL

## 2025-07-30 PROCEDURE — G8419 CALC BMI OUT NRM PARAM NOF/U: HCPCS | Performed by: OBSTETRICS & GYNECOLOGY

## 2025-07-30 PROCEDURE — 1036F TOBACCO NON-USER: CPT | Performed by: OBSTETRICS & GYNECOLOGY

## 2025-07-30 PROCEDURE — 0502F SUBSEQUENT PRENATAL CARE: CPT | Performed by: OBSTETRICS & GYNECOLOGY

## 2025-07-30 PROCEDURE — G8427 DOCREV CUR MEDS BY ELIG CLIN: HCPCS | Performed by: OBSTETRICS & GYNECOLOGY

## 2025-07-30 NOTE — PROGRESS NOTES
OB visit      Evy Troy is a 25 y.o. year old female  @ L 25 , 29.3 wks , OSWALDO 10/12/25 confirmed by 9-week office ultrasound please see the report below. Complaints : denies.   History of Present Illness  The patient presents for evaluation of pregnancy.    She is currently in her fourth pregnancy, with two children at home. Her last menstrual cycle was from 2025 to 2025. She has experienced one episode of vomiting and severe hyperosmia, which has been particularly challenging during meal preparation due to the aversion to food smells. She is not currently taking aspirin or any other medications, except for Tylenol. She attempted to take prenatal vitamins but found that the gummy form induced vomiting. She is interested in chromosomal testing. She has not undergone any cardiac evaluations despite her history of preeclampsia. She has a history of severe acid reflux during her previous pregnancies but reports no current issues. 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 She has previously received epidural injections, although the last one only provided numbness on her right side.    She has a history of preeclampsia, which necessitated an early delivery of her first child at 36 weeks in . Her second child was delivered vaginally at full term in , but she again experienced preeclampsia. Her third child was born prematurely at 36 weeks in  due to severe illness and elevated blood pressure, indicative of severe preeclampsia. She was diagnosed with gestational diabetes during her third pregnancy. She was prescribed labetalol postpartum but is not currently taking it. She has been monitoring her blood pressure at home, noting occasional elevations to 140 or 160 systolic during headaches. She continues to experience chest pain, including an episode this morning, but her blood pressure was recorded as 108

## 2025-08-19 ENCOUNTER — ROUTINE PRENATAL (OUTPATIENT)
Dept: OBGYN CLINIC | Age: 30
End: 2025-08-19

## 2025-08-19 VITALS
DIASTOLIC BLOOD PRESSURE: 64 MMHG | WEIGHT: 263 LBS | BODY MASS INDEX: 38.84 KG/M2 | SYSTOLIC BLOOD PRESSURE: 126 MMHG | HEART RATE: 88 BPM

## 2025-08-19 DIAGNOSIS — Z3A.32 32 WEEKS GESTATION OF PREGNANCY: Primary | ICD-10-CM

## 2025-08-19 PROCEDURE — G8427 DOCREV CUR MEDS BY ELIG CLIN: HCPCS | Performed by: OBSTETRICS & GYNECOLOGY

## 2025-08-19 PROCEDURE — G8419 CALC BMI OUT NRM PARAM NOF/U: HCPCS | Performed by: OBSTETRICS & GYNECOLOGY

## 2025-08-19 PROCEDURE — 1036F TOBACCO NON-USER: CPT | Performed by: OBSTETRICS & GYNECOLOGY

## 2025-08-19 PROCEDURE — 0502F SUBSEQUENT PRENATAL CARE: CPT | Performed by: OBSTETRICS & GYNECOLOGY
